# Patient Record
Sex: FEMALE | Race: WHITE | Employment: UNEMPLOYED | URBAN - NONMETROPOLITAN AREA
[De-identification: names, ages, dates, MRNs, and addresses within clinical notes are randomized per-mention and may not be internally consistent; named-entity substitution may affect disease eponyms.]

---

## 2017-01-08 ENCOUNTER — HOSPITAL ENCOUNTER (EMERGENCY)
Age: 22
Discharge: HOME | End: 2017-01-08
Attending: EMERGENCY MEDICINE
Payer: MEDICAID

## 2017-01-08 VITALS
SYSTOLIC BLOOD PRESSURE: 115 MMHG | HEART RATE: 79 BPM | OXYGEN SATURATION: 99 % | RESPIRATION RATE: 16 BRPM | BODY MASS INDEX: 25.71 KG/M2 | WEIGHT: 160 LBS | HEIGHT: 66 IN | TEMPERATURE: 98.1 F | DIASTOLIC BLOOD PRESSURE: 70 MMHG

## 2017-01-08 DIAGNOSIS — F32.A DEPRESSION, UNSPECIFIED DEPRESSION TYPE: ICD-10-CM

## 2017-01-08 DIAGNOSIS — F10.929 ACUTE ALCOHOLIC INTOXICATION, WITH UNSPECIFIED COMPLICATION (HCC): Primary | ICD-10-CM

## 2017-01-08 LAB
ACETAMINOPHEN LEVEL: <15 UG/ML
ALBUMIN SERPL-MCNC: 5 G/DL (ref 3.5–5.2)
ALP BLD-CCNC: 95 U/L (ref 35–104)
ALT SERPL-CCNC: 48 U/L (ref 5–33)
AMPHETAMINE SCREEN, URINE: NEGATIVE
ANION GAP SERPL CALCULATED.3IONS-SCNC: 15 MMOL/L (ref 7–19)
ANION GAP SERPL CALCULATED.3IONS-SCNC: 22 MMOL/L (ref 7–19)
AST SERPL-CCNC: 25 U/L (ref 5–32)
BARBITURATE SCREEN URINE: NEGATIVE
BASOPHILS ABSOLUTE: 0.1 K/UL (ref 0–0.2)
BASOPHILS RELATIVE PERCENT: 0.6 % (ref 0–1)
BENZODIAZEPINE SCREEN, URINE: NEGATIVE
BILIRUB SERPL-MCNC: <0.2 MG/DL (ref 0.2–1.2)
BILIRUBIN URINE: NEGATIVE
BLOOD, URINE: NEGATIVE
BUN BLDV-MCNC: 10 MG/DL (ref 6–20)
BUN BLDV-MCNC: 11 MG/DL (ref 6–20)
CALCIUM SERPL-MCNC: 9.3 MG/DL (ref 8.6–10)
CALCIUM SERPL-MCNC: 9.7 MG/DL (ref 8.6–10)
CANNABINOID SCREEN URINE: NEGATIVE
CHLORIDE BLD-SCNC: 102 MMOL/L (ref 98–111)
CHLORIDE BLD-SCNC: 104 MMOL/L (ref 98–111)
CLARITY: CLEAR
CO2: 18 MMOL/L (ref 22–29)
CO2: 22 MMOL/L (ref 22–29)
COCAINE METABOLITE SCREEN URINE: NEGATIVE
COLOR: YELLOW
CREAT SERPL-MCNC: 0.6 MG/DL (ref 0.5–0.9)
CREAT SERPL-MCNC: 0.7 MG/DL (ref 0.5–0.9)
EOSINOPHILS ABSOLUTE: 0 K/UL (ref 0–0.6)
EOSINOPHILS RELATIVE PERCENT: 0.2 % (ref 0–5)
ETHANOL: 236 MG/DL (ref 0–0.08)
ETHANOL: 85 MG/DL (ref 0–0.08)
GFR NON-AFRICAN AMERICAN: >60
GFR NON-AFRICAN AMERICAN: >60
GLOBULIN: 2.9 G/DL
GLUCOSE BLD-MCNC: 114 MG/DL (ref 74–109)
GLUCOSE BLD-MCNC: 88 MG/DL (ref 74–109)
GLUCOSE URINE: NEGATIVE MG/DL
HCG QUALITATIVE: NEGATIVE
HCT VFR BLD CALC: 40.9 % (ref 37–47)
HEMOGLOBIN: 13.7 G/DL (ref 12–16)
KETONES, URINE: NEGATIVE MG/DL
LEUKOCYTE ESTERASE, URINE: NEGATIVE
LYMPHOCYTES ABSOLUTE: 6.1 K/UL (ref 1.1–4.5)
LYMPHOCYTES RELATIVE PERCENT: 58 % (ref 20–40)
Lab: NORMAL
MCH RBC QN AUTO: 29.5 PG (ref 27–31)
MCHC RBC AUTO-ENTMCNC: 33.5 G/DL (ref 33–37)
MCV RBC AUTO: 88 FL (ref 81–99)
MONOCYTES ABSOLUTE: 0.5 K/UL (ref 0–0.9)
MONOCYTES RELATIVE PERCENT: 4.5 % (ref 0–10)
NEUTROPHILS ABSOLUTE: 3.8 K/UL (ref 1.5–7.5)
NEUTROPHILS RELATIVE PERCENT: 36.3 % (ref 50–65)
NITRITE, URINE: NEGATIVE
OPIATE SCREEN URINE: NEGATIVE
PDW BLD-RTO: 14.2 % (ref 11.5–14.5)
PH UA: 5.5
PLATELET # BLD: 409 K/UL (ref 130–400)
PMV BLD AUTO: 9.9 FL (ref 7.4–10.4)
POTASSIUM SERPL-SCNC: 2.9 MMOL/L (ref 3.5–5)
POTASSIUM SERPL-SCNC: 4 MMOL/L (ref 3.5–5)
PROTEIN UA: ABNORMAL MG/DL
RBC # BLD: 4.65 M/UL (ref 4.2–5.4)
SALICYLATE, SERUM: <3 MG/DL (ref 3–10)
SODIUM BLD-SCNC: 141 MMOL/L (ref 136–145)
SODIUM BLD-SCNC: 142 MMOL/L (ref 136–145)
SPECIFIC GRAVITY UA: 1.01
TOTAL PROTEIN: 7.9 G/DL (ref 6.6–8.7)
TSH SERPL DL<=0.05 MIU/L-ACNC: 4.01 UIU/ML (ref 0.27–4.2)
UROBILINOGEN, URINE: 0.2 E.U./DL
WBC # BLD: 10.4 K/UL (ref 4.8–10.8)

## 2017-01-08 PROCEDURE — 84443 ASSAY THYROID STIM HORMONE: CPT

## 2017-01-08 PROCEDURE — 80307 DRUG TEST PRSMV CHEM ANLYZR: CPT

## 2017-01-08 PROCEDURE — 84703 CHORIONIC GONADOTROPIN ASSAY: CPT

## 2017-01-08 PROCEDURE — 99284 EMERGENCY DEPT VISIT MOD MDM: CPT | Performed by: EMERGENCY MEDICINE

## 2017-01-08 PROCEDURE — 2500000003 HC RX 250 WO HCPCS: Performed by: EMERGENCY MEDICINE

## 2017-01-08 PROCEDURE — 81003 URINALYSIS AUTO W/O SCOPE: CPT

## 2017-01-08 PROCEDURE — G0480 DRUG TEST DEF 1-7 CLASSES: HCPCS

## 2017-01-08 PROCEDURE — 6370000000 HC RX 637 (ALT 250 FOR IP): Performed by: EMERGENCY MEDICINE

## 2017-01-08 PROCEDURE — 36415 COLL VENOUS BLD VENIPUNCTURE: CPT

## 2017-01-08 PROCEDURE — 99284 EMERGENCY DEPT VISIT MOD MDM: CPT

## 2017-01-08 PROCEDURE — 85025 COMPLETE CBC W/AUTO DIFF WBC: CPT

## 2017-01-08 PROCEDURE — 80053 COMPREHEN METABOLIC PANEL: CPT

## 2017-01-08 PROCEDURE — 6360000002 HC RX W HCPCS: Performed by: EMERGENCY MEDICINE

## 2017-01-08 PROCEDURE — 80048 BASIC METABOLIC PNL TOTAL CA: CPT

## 2017-01-08 PROCEDURE — 2580000003 HC RX 258: Performed by: EMERGENCY MEDICINE

## 2017-01-08 RX ORDER — PHENAZOPYRIDINE HYDROCHLORIDE 200 MG/1
200 TABLET, FILM COATED ORAL ONCE
Status: COMPLETED | OUTPATIENT
Start: 2017-01-08 | End: 2017-01-08

## 2017-01-08 RX ADMIN — PHENAZOPYRIDINE 200 MG: 200 TABLET ORAL at 03:05

## 2017-01-08 RX ADMIN — FOLIC ACID: 5 INJECTION, SOLUTION INTRAMUSCULAR; INTRAVENOUS; SUBCUTANEOUS at 03:20

## 2017-01-08 ASSESSMENT — ENCOUNTER SYMPTOMS
EYE DISCHARGE: 0
CHOKING: 0
NAUSEA: 0
VOICE CHANGE: 0
DIARRHEA: 0
CONSTIPATION: 0
BLOOD IN STOOL: 0
APNEA: 0
SORE THROAT: 0
FACIAL SWELLING: 0
SINUS PRESSURE: 0

## 2017-01-19 RX ORDER — MISOPROSTOL 100 UG/1
TABLET ORAL
Qty: 1 TABLET | Refills: 0 | Status: SHIPPED | OUTPATIENT
Start: 2017-01-19 | End: 2017-02-17

## 2017-01-20 ENCOUNTER — PROCEDURE VISIT (OUTPATIENT)
Dept: OBGYN | Age: 22
End: 2017-01-20
Payer: MEDICAID

## 2017-01-20 VITALS
SYSTOLIC BLOOD PRESSURE: 126 MMHG | HEART RATE: 94 BPM | DIASTOLIC BLOOD PRESSURE: 84 MMHG | WEIGHT: 148 LBS | TEMPERATURE: 97.4 F | BODY MASS INDEX: 23.89 KG/M2

## 2017-01-20 DIAGNOSIS — Z30.430 ENCOUNTER FOR IUD INSERTION: Primary | ICD-10-CM

## 2017-01-20 DIAGNOSIS — F41.9 ANXIETY AND DEPRESSION: ICD-10-CM

## 2017-01-20 DIAGNOSIS — F32.A ANXIETY AND DEPRESSION: ICD-10-CM

## 2017-01-20 PROCEDURE — 99214 OFFICE O/P EST MOD 30 MIN: CPT | Performed by: OBSTETRICS & GYNECOLOGY

## 2017-01-20 PROCEDURE — 58300 INSERT INTRAUTERINE DEVICE: CPT | Performed by: OBSTETRICS & GYNECOLOGY

## 2017-01-30 ENCOUNTER — TELEPHONE (OUTPATIENT)
Dept: OBGYN | Age: 22
End: 2017-01-30

## 2017-02-16 ENCOUNTER — TELEPHONE (OUTPATIENT)
Dept: OBGYN | Age: 22
End: 2017-02-16

## 2017-02-17 ENCOUNTER — OFFICE VISIT (OUTPATIENT)
Dept: OBGYN | Age: 22
End: 2017-02-17
Payer: MEDICAID

## 2017-02-17 VITALS
WEIGHT: 144 LBS | SYSTOLIC BLOOD PRESSURE: 120 MMHG | RESPIRATION RATE: 18 BRPM | DIASTOLIC BLOOD PRESSURE: 74 MMHG | HEIGHT: 66 IN | BODY MASS INDEX: 23.14 KG/M2 | TEMPERATURE: 97.7 F | HEART RATE: 90 BPM

## 2017-02-17 DIAGNOSIS — Z30.431 IUD CHECK UP: ICD-10-CM

## 2017-02-17 DIAGNOSIS — R30.0 BURNING WITH URINATION: Primary | ICD-10-CM

## 2017-02-17 LAB
APPEARANCE FLUID: CLEAR
BILIRUBIN, POC: NORMAL
BLOOD URINE, POC: NORMAL
CLARITY, POC: CLEAR
COLOR, POC: YELLOW
GLUCOSE URINE, POC: NORMAL
KETONES, POC: NORMAL
LEUKOCYTE EST, POC: NORMAL
NITRITE, POC: NORMAL
PH, POC: 5
PROTEIN, POC: NORMAL
SPECIFIC GRAVITY, POC: 1.01
UROBILINOGEN, POC: 0.2

## 2017-02-17 PROCEDURE — 99213 OFFICE O/P EST LOW 20 MIN: CPT | Performed by: NURSE PRACTITIONER

## 2017-02-17 ASSESSMENT — ENCOUNTER SYMPTOMS
SORE THROAT: 0
APNEA: 0
CONSTIPATION: 0
TROUBLE SWALLOWING: 0
NAUSEA: 0
DIARRHEA: 0
SHORTNESS OF BREATH: 0
WHEEZING: 0
ABDOMINAL PAIN: 0

## 2017-02-21 PROBLEM — K21.9 GASTROESOPHAGEAL REFLUX DISEASE: Status: ACTIVE | Noted: 2017-02-21

## 2017-02-21 PROBLEM — N20.9 UROLITHIASIS: Status: ACTIVE | Noted: 2017-02-21

## 2017-02-21 PROBLEM — Z00.00 WELLNESS EXAMINATION: Status: ACTIVE | Noted: 2017-02-21

## 2017-02-21 PROBLEM — F90.9 ADHD (ATTENTION DEFICIT HYPERACTIVITY DISORDER): Status: ACTIVE | Noted: 2017-02-21

## 2017-02-21 PROBLEM — R87.810 CERVICAL HIGH RISK HPV (HUMAN PAPILLOMAVIRUS) TEST POSITIVE: Status: ACTIVE | Noted: 2017-02-21

## 2017-02-22 ENCOUNTER — OFFICE VISIT (OUTPATIENT)
Dept: FAMILY MEDICINE CLINIC | Facility: CLINIC | Age: 22
End: 2017-02-22

## 2017-02-22 VITALS
BODY MASS INDEX: 22.98 KG/M2 | HEIGHT: 66 IN | SYSTOLIC BLOOD PRESSURE: 120 MMHG | RESPIRATION RATE: 16 BRPM | DIASTOLIC BLOOD PRESSURE: 66 MMHG | TEMPERATURE: 98.7 F | OXYGEN SATURATION: 99 % | WEIGHT: 143 LBS | HEART RATE: 108 BPM

## 2017-02-22 DIAGNOSIS — F90.9 ATTENTION DEFICIT HYPERACTIVITY DISORDER (ADHD), UNSPECIFIED ADHD TYPE: Primary | ICD-10-CM

## 2017-02-22 PROCEDURE — 99213 OFFICE O/P EST LOW 20 MIN: CPT | Performed by: FAMILY MEDICINE

## 2017-02-22 RX ORDER — DEXTROAMPHETAMINE SACCHARATE, AMPHETAMINE ASPARTATE, DEXTROAMPHETAMINE SULFATE AND AMPHETAMINE SULFATE 3.75; 3.75; 3.75; 3.75 MG/1; MG/1; MG/1; MG/1
15 TABLET ORAL DAILY
Qty: 30 TABLET | Refills: 0 | Status: SHIPPED | OUTPATIENT
Start: 2017-02-22 | End: 2017-03-24 | Stop reason: SDUPTHER

## 2017-02-22 NOTE — PROGRESS NOTES
Subjective   Suzi Vasquez is a 21 y.o. female presenting with chief complaint of:   Chief Complaint   Patient presents with   • ADD       History of Present Illness :  Alone.  Has multiple chronic problems; interval appointment.  One of these problems is ADHD.  Had as child.  Now that she has young twins; finding it hard to stay focused to get all the work needed done.  Denies depression, anxiety, anorexia, weight loss, palpitations.     Other chronic problem/s to consider: none    The following portions of the patient's history were reviewed and updated as appropriate: allergies, current medications, past family history, past medical history, past social history, past surgical history and problem list.  TCC migrated if needed/reviewed.      Current Outpatient Prescriptions:   •  levonorgestrel (MIRENA, 52 MG,) 20 MCG/24HR IUD, BC IMPLANT, Disp: , Rfl:     No Known Allergies    Review of Systems  GENERAL:  Active home; with twins. Sleep is often interrupted; otherwise ok. No fever.  ENDO:  No syncope, near or diaphoretic sweaty spells.  No BS issues during pregnancy.  HEENT: No head injury  headache,  No vision change, No hearing loss.  Ears without pain/drainage.  No sore throat.  No nasal/sinus congestion/drainage. No epistaxis.  CHEST: No chest wall tenderness or mass. Nocough,  without wheeze, SOB; no hemoptysis.  CV: No chest pain, palpatations, ankle edema.  GI: No heartburn, dysphagia.  No abdominal pain, diarrhea, constipation, rectal bleeding, or melena.    :  Voids without dysuria, or incontience to completion.  ORTHO: No painful/swollen joints but various on /off sore.  No  sore neck or back.  No acute neck or back pain without recent injury.   NEURO: No dizziness, weakness of extremities.  No numbness/parethesias.   PSYCH: No memory loss.  Mood good; not anxious, depressed but/and not suicidal.  Tolerated stress.     Results for orders placed or performed during the hospital encounter of 09/28/16  "  CBC Auto Differential   Result Value Ref Range    WBC 9.93 4.80 - 10.80 10*3/mm3    RBC 3.85 (L) 4.20 - 5.40 10*6/mm3    Hemoglobin 10.9 (L) 12.0 - 16.0 g/dL    Hematocrit 33.4 (L) 37.0 - 47.0 %    MCV 86.8 82.0 - 98.0 fL    MCH 28.3 28.0 - 32.0 pg    MCHC 32.6 (L) 33.0 - 36.0 g/dL    RDW 17.4 (H) 12.0 - 15.0 %    RDW-SD 51.5 40.0 - 54.0 fl    MPV 11.8 6.0 - 12.0 fL    Platelets 220 130 - 400 10*3/mm3    Neutrophil % 68.3 39.0 - 78.0 %    Lymphocyte % 23.1 15.0 - 45.0 %    Monocyte % 6.5 4.0 - 12.0 %    Eosinophil % 0.8 0.0 - 4.0 %    Basophil % 0.4 0.0 - 2.0 %    Immature Grans % 0.9 0.0 - 5.0 %    Neutrophils, Absolute 6.78 1.87 - 8.40 10*3/mm3    Lymphocytes, Absolute 2.29 0.72 - 4.86 10*3/mm3    Monocytes, Absolute 0.65 0.19 - 1.30 10*3/mm3    Eosinophils, Absolute 0.08 0.00 - 0.70 10*3/mm3    Basophils, Absolute 0.04 0.00 - 0.20 10*3/mm3    Immature Grans, Absolute 0.09 (H) 0.00 - 0.03 10*3/mm3   Type & Screen   Result Value Ref Range    ABO Type A     RH type Positive     Antibody Screen Negative        No results found for: HGBA1C    No results found for: NA, K, CL, CO2, GLUCOSE, BUN, CREATININE, CALCIUM, EGFRCLEARA    No results found for: PSA     Objective   Visit Vitals   • /66 (BP Location: Right arm, Patient Position: Sitting, Cuff Size: Adult)   • Pulse 108   • Temp 98.7 °F (37.1 °C) (Oral)   • Resp 16   • Ht 66\" (167.6 cm)   • Wt 143 lb (64.9 kg)   • SpO2 99%   • Breastfeeding No   • BMI 23.08 kg/m2       Physical Exam  GENERAL:  Well nourished/developed in no acute distress. Thin  SKIN: Turgor excellent, without wound, rash, lesion  HEENT: Normal cephalic without trauma.  Pupils equal round reactive to light. Extraocular motions full without nystagmus.   External canals nonobstructive nontender without reddness. Tymphatic membranes loly with gary structures intact.   Oral cavity without growths, exudates, and moist.  Posterior pharnyx without mass, obstruction, reddness.  No thyroidmegaly, " mass, tenderness, lymphadenopathy and supple.  CV: Regular rhythm.  No murmur, gallop,  edema. Posterior pulses intact.  No carotid bruits.  CHEST: No chest wall tenderness or mass.   LUNGS: Symmetric motion with clear to auscultation.   ABD: Soft, nontender without mass.   ORTHO: Symmetric extremities without swelling/point tenderness.  Full gross range of motion.    NEURO: CN 2-12 grossly intact.  Symmetric facies. 1/4 x bicep knee equal reflexes.  UE/LE   4/5 strength throughout.  Nonfocal use extremities. Speech clear. Intact light touch with monofilament, vibratory sensation with tuning fork; equal toes/distal feet.    PSYCH: Oriented x 3.  Pleasant calm, well kept.  Purposeful/directed conservation with intact short/long gross memory.     Assessment/Plan     1. Attention deficit hyperactivity disorder (ADHD), unspecified ADHD type  - amphetamine-dextroamphetamine (ADDERALL) 15 MG tablet; Take 1 tablet by mouth Daily.  Dispense: 30 tablet; Refill: 0      Rx: reviewed.  Changes if above  LAB: reviewed/above, and if orders    There are no Patient Instructions on file for this visit.    Follow up: Return for 3m.

## 2017-03-10 ENCOUNTER — OFFICE VISIT (OUTPATIENT)
Dept: OBGYN | Age: 22
End: 2017-03-10
Payer: MEDICAID

## 2017-03-10 VITALS
WEIGHT: 143 LBS | BODY MASS INDEX: 22.98 KG/M2 | DIASTOLIC BLOOD PRESSURE: 60 MMHG | HEIGHT: 66 IN | SYSTOLIC BLOOD PRESSURE: 120 MMHG

## 2017-03-10 DIAGNOSIS — Z30.431 IUD CHECK UP: Primary | ICD-10-CM

## 2017-03-10 PROCEDURE — 99213 OFFICE O/P EST LOW 20 MIN: CPT | Performed by: OBSTETRICS & GYNECOLOGY

## 2017-03-10 ASSESSMENT — ENCOUNTER SYMPTOMS
EYES NEGATIVE: 1
GASTROINTESTINAL NEGATIVE: 1
ALLERGIC/IMMUNOLOGIC NEGATIVE: 1
RESPIRATORY NEGATIVE: 1

## 2017-03-24 DIAGNOSIS — F90.9 ATTENTION DEFICIT HYPERACTIVITY DISORDER (ADHD), UNSPECIFIED ADHD TYPE: ICD-10-CM

## 2017-03-24 RX ORDER — DEXTROAMPHETAMINE SACCHARATE, AMPHETAMINE ASPARTATE, DEXTROAMPHETAMINE SULFATE AND AMPHETAMINE SULFATE 3.75; 3.75; 3.75; 3.75 MG/1; MG/1; MG/1; MG/1
15 TABLET ORAL DAILY
Qty: 30 TABLET | Refills: 0 | Status: SHIPPED | OUTPATIENT
Start: 2017-03-24 | End: 2017-04-25 | Stop reason: SDUPTHER

## 2017-04-25 DIAGNOSIS — F90.9 ATTENTION DEFICIT HYPERACTIVITY DISORDER (ADHD), UNSPECIFIED ADHD TYPE: ICD-10-CM

## 2017-04-25 RX ORDER — DEXTROAMPHETAMINE SACCHARATE, AMPHETAMINE ASPARTATE, DEXTROAMPHETAMINE SULFATE AND AMPHETAMINE SULFATE 3.75; 3.75; 3.75; 3.75 MG/1; MG/1; MG/1; MG/1
15 TABLET ORAL DAILY
Qty: 30 TABLET | Refills: 0 | Status: SHIPPED | OUTPATIENT
Start: 2017-04-25 | End: 2017-06-01 | Stop reason: SDUPTHER

## 2017-05-16 PROBLEM — F32.A DEPRESSION: Status: ACTIVE | Noted: 2017-05-16

## 2017-05-16 PROBLEM — R61 HYPERHIDROSIS: Status: ACTIVE | Noted: 2017-05-16

## 2017-05-22 ENCOUNTER — OFFICE VISIT (OUTPATIENT)
Dept: FAMILY MEDICINE CLINIC | Facility: CLINIC | Age: 22
End: 2017-05-22

## 2017-05-22 VITALS
WEIGHT: 146 LBS | HEART RATE: 92 BPM | HEIGHT: 66 IN | SYSTOLIC BLOOD PRESSURE: 118 MMHG | OXYGEN SATURATION: 99 % | DIASTOLIC BLOOD PRESSURE: 60 MMHG | BODY MASS INDEX: 23.46 KG/M2

## 2017-05-22 DIAGNOSIS — F32.A DEPRESSION, UNSPECIFIED DEPRESSION TYPE: ICD-10-CM

## 2017-05-22 DIAGNOSIS — F90.9 ATTENTION DEFICIT HYPERACTIVITY DISORDER (ADHD), UNSPECIFIED ADHD TYPE: Primary | ICD-10-CM

## 2017-05-22 PROCEDURE — 99213 OFFICE O/P EST LOW 20 MIN: CPT | Performed by: FAMILY MEDICINE

## 2017-06-01 DIAGNOSIS — F90.9 ATTENTION DEFICIT HYPERACTIVITY DISORDER (ADHD), UNSPECIFIED ADHD TYPE: ICD-10-CM

## 2017-06-01 RX ORDER — DEXTROAMPHETAMINE SACCHARATE, AMPHETAMINE ASPARTATE, DEXTROAMPHETAMINE SULFATE AND AMPHETAMINE SULFATE 3.75; 3.75; 3.75; 3.75 MG/1; MG/1; MG/1; MG/1
15 TABLET ORAL DAILY
Qty: 30 TABLET | Refills: 0 | Status: SHIPPED | OUTPATIENT
Start: 2017-06-01 | End: 2017-07-03 | Stop reason: SDUPTHER

## 2017-07-03 DIAGNOSIS — F90.9 ATTENTION DEFICIT HYPERACTIVITY DISORDER (ADHD), UNSPECIFIED ADHD TYPE: ICD-10-CM

## 2017-07-03 RX ORDER — DEXTROAMPHETAMINE SACCHARATE, AMPHETAMINE ASPARTATE, DEXTROAMPHETAMINE SULFATE AND AMPHETAMINE SULFATE 3.75; 3.75; 3.75; 3.75 MG/1; MG/1; MG/1; MG/1
15 TABLET ORAL DAILY
Qty: 30 TABLET | Refills: 0 | Status: SHIPPED | OUTPATIENT
Start: 2017-07-03 | End: 2017-08-11 | Stop reason: SDUPTHER

## 2017-07-10 ENCOUNTER — OFFICE VISIT (OUTPATIENT)
Dept: OBGYN | Age: 22
End: 2017-07-10
Payer: MEDICAID

## 2017-07-10 VITALS
HEART RATE: 82 BPM | SYSTOLIC BLOOD PRESSURE: 108 MMHG | BODY MASS INDEX: 23.63 KG/M2 | DIASTOLIC BLOOD PRESSURE: 73 MMHG | WEIGHT: 147 LBS | HEIGHT: 66 IN

## 2017-07-10 DIAGNOSIS — R10.2 PELVIC PAIN IN FEMALE: Primary | ICD-10-CM

## 2017-07-10 DIAGNOSIS — Z30.431 IUD SURVEILLANCE: ICD-10-CM

## 2017-07-10 PROCEDURE — 99213 OFFICE O/P EST LOW 20 MIN: CPT | Performed by: NURSE PRACTITIONER

## 2017-07-10 RX ORDER — DEXTROAMPHETAMINE SACCHARATE, AMPHETAMINE ASPARTATE, DEXTROAMPHETAMINE SULFATE AND AMPHETAMINE SULFATE 3.75; 3.75; 3.75; 3.75 MG/1; MG/1; MG/1; MG/1
TABLET ORAL
COMMUNITY
Start: 2017-07-03

## 2017-07-10 ASSESSMENT — ENCOUNTER SYMPTOMS
RESPIRATORY NEGATIVE: 1
EYES NEGATIVE: 1
GASTROINTESTINAL NEGATIVE: 1

## 2017-07-17 ENCOUNTER — HOSPITAL ENCOUNTER (OUTPATIENT)
Dept: ULTRASOUND IMAGING | Age: 22
Discharge: HOME | End: 2017-07-17
Payer: MEDICAID

## 2017-07-17 DIAGNOSIS — Z30.431 IUD SURVEILLANCE: ICD-10-CM

## 2017-07-17 DIAGNOSIS — R10.2 PELVIC PAIN IN FEMALE: ICD-10-CM

## 2017-07-17 PROCEDURE — 76830 TRANSVAGINAL US NON-OB: CPT

## 2017-07-18 DIAGNOSIS — N83.201 RIGHT OVARIAN CYST: Primary | ICD-10-CM

## 2017-07-20 ENCOUNTER — TELEPHONE (OUTPATIENT)
Dept: OBGYN | Age: 22
End: 2017-07-20

## 2017-08-11 DIAGNOSIS — F90.9 ATTENTION DEFICIT HYPERACTIVITY DISORDER (ADHD), UNSPECIFIED ADHD TYPE: ICD-10-CM

## 2017-08-11 RX ORDER — DEXTROAMPHETAMINE SACCHARATE, AMPHETAMINE ASPARTATE, DEXTROAMPHETAMINE SULFATE AND AMPHETAMINE SULFATE 3.75; 3.75; 3.75; 3.75 MG/1; MG/1; MG/1; MG/1
15 TABLET ORAL DAILY
Qty: 30 TABLET | Refills: 0 | Status: SHIPPED | OUTPATIENT
Start: 2017-08-11 | End: 2017-09-19 | Stop reason: SDUPTHER

## 2017-09-18 ENCOUNTER — TELEPHONE (OUTPATIENT)
Dept: OBGYN | Age: 22
End: 2017-09-18

## 2017-09-19 DIAGNOSIS — F90.9 ATTENTION DEFICIT HYPERACTIVITY DISORDER (ADHD), UNSPECIFIED ADHD TYPE: ICD-10-CM

## 2017-09-19 RX ORDER — DEXTROAMPHETAMINE SACCHARATE, AMPHETAMINE ASPARTATE, DEXTROAMPHETAMINE SULFATE AND AMPHETAMINE SULFATE 3.75; 3.75; 3.75; 3.75 MG/1; MG/1; MG/1; MG/1
15 TABLET ORAL DAILY
Qty: 30 TABLET | Refills: 0 | Status: SHIPPED | OUTPATIENT
Start: 2017-09-19 | End: 2017-10-23 | Stop reason: SDUPTHER

## 2017-09-20 ENCOUNTER — OFFICE VISIT (OUTPATIENT)
Dept: FAMILY MEDICINE CLINIC | Facility: CLINIC | Age: 22
End: 2017-09-20

## 2017-09-20 ENCOUNTER — TELEPHONE (OUTPATIENT)
Dept: FAMILY MEDICINE CLINIC | Facility: CLINIC | Age: 22
End: 2017-09-20

## 2017-09-20 VITALS
HEIGHT: 66 IN | BODY MASS INDEX: 23.63 KG/M2 | HEART RATE: 107 BPM | SYSTOLIC BLOOD PRESSURE: 118 MMHG | OXYGEN SATURATION: 98 % | DIASTOLIC BLOOD PRESSURE: 82 MMHG | RESPIRATION RATE: 16 BRPM | WEIGHT: 147 LBS

## 2017-09-20 DIAGNOSIS — R21 RASH: ICD-10-CM

## 2017-09-20 DIAGNOSIS — L29.9 ITCHING: Primary | ICD-10-CM

## 2017-09-20 PROCEDURE — 99213 OFFICE O/P EST LOW 20 MIN: CPT | Performed by: FAMILY MEDICINE

## 2017-09-20 PROCEDURE — 96372 THER/PROPH/DIAG INJ SC/IM: CPT | Performed by: FAMILY MEDICINE

## 2017-09-20 RX ORDER — DEXAMETHASONE SODIUM PHOSPHATE 10 MG/ML
10 INJECTION INTRAMUSCULAR; INTRAVENOUS ONCE
Status: DISCONTINUED | OUTPATIENT
Start: 2017-09-20 | End: 2017-09-20

## 2017-09-20 RX ORDER — HYDROXYZINE PAMOATE 25 MG/1
25 CAPSULE ORAL 3 TIMES DAILY PRN
Qty: 20 CAPSULE | Refills: 1 | Status: SHIPPED | OUTPATIENT
Start: 2017-09-20 | End: 2017-12-05

## 2017-09-20 RX ORDER — DEXAMETHASONE SODIUM PHOSPHATE 4 MG/ML
10 INJECTION, SOLUTION INTRA-ARTICULAR; INTRALESIONAL; INTRAMUSCULAR; INTRAVENOUS; SOFT TISSUE ONCE
Status: COMPLETED | OUTPATIENT
Start: 2017-09-20 | End: 2017-09-20

## 2017-09-20 RX ORDER — METHYLPREDNISOLONE 4 MG/1
TABLET ORAL
Qty: 1 EACH | Refills: 0 | Status: SHIPPED | OUTPATIENT
Start: 2017-09-20 | End: 2017-12-05

## 2017-09-20 RX ADMIN — DEXAMETHASONE SODIUM PHOSPHATE 10 MG: 4 INJECTION, SOLUTION INTRA-ARTICULAR; INTRALESIONAL; INTRAMUSCULAR; INTRAVENOUS; SOFT TISSUE at 12:02

## 2017-09-21 NOTE — PROGRESS NOTES
Subjective   Suzi Vasquez is a 22 y.o. female presenting with chief complaint of:   Chief Complaint   Patient presents with   • hives     severe itching / right lower side/ both legs / under left arm        History of Present Illness :  Alone.  Here for primarily an acute issue today; rash R side/others since yesterday.  Very pruritic. No known cause.  No fever; no others involved.   Has  chronic problems to consider; not interval appointment.  One of these problems is ADD  .     Other chronic problem/s to consider: none    Has an/another acute issue today: none.    The following portions of the patient's history were reviewed and updated as appropriate: allergies, current medications, past family history, past medical history, past social history, past surgical history and problem list.  Records acquired and reviewed; TCC migrated.      Current Outpatient Prescriptions:   •  amphetamine-dextroamphetamine (ADDERALL) 15 MG tablet, Take 1 tablet by mouth Daily., Disp: 30 tablet, Rfl: 0  •  levonorgestrel (MIRENA, 52 MG,) 20 MCG/24HR IUD, BC IMPLANT, Disp: , Rfl:     No Known Allergies    Review of Systems  GENERAL:  Active home. Sleep is ok. No fever now.  ENDO:  No syncope, near or diaphoretic sweaty spells.  HEENT: No head injury or headache,  No vision change, No hearing loss.  Ears without pain/drainage.  No sore throat.  No significant nasal/sinus congestion/drainage. No epistaxis.  CHEST: No chest wall tenderness or mass. No significant cough, without wheeze, SOB; no hemoptysis.  CV: No chest pain, palpitations, ankle edema.  GI: No heartburn, dysphagia.  No abdominal pain, diarrhea, constipation, rectal bleeding, or melena.    :  Voids without dysuria, or incontinence to completion.  ORTHO: No painful/swollen joints but various on /off sore.  No sore neck or back.  No acute neck or back pain without recent injury.   NEURO: No dizziness, weakness of extremities.  No numbness/paresthesias.   PSYCH: No  "memory loss.  Mood good; occ anxious, depressed but/and not suicidal.  Tolerated stress.     Results for orders placed or performed during the hospital encounter of 09/28/16   CBC Auto Differential   Result Value Ref Range    WBC 9.93 4.80 - 10.80 10*3/mm3    RBC 3.85 (L) 4.20 - 5.40 10*6/mm3    Hemoglobin 10.9 (L) 12.0 - 16.0 g/dL    Hematocrit 33.4 (L) 37.0 - 47.0 %    MCV 86.8 82.0 - 98.0 fL    MCH 28.3 28.0 - 32.0 pg    MCHC 32.6 (L) 33.0 - 36.0 g/dL    RDW 17.4 (H) 12.0 - 15.0 %    RDW-SD 51.5 40.0 - 54.0 fl    MPV 11.8 6.0 - 12.0 fL    Platelets 220 130 - 400 10*3/mm3    Neutrophil % 68.3 39.0 - 78.0 %    Lymphocyte % 23.1 15.0 - 45.0 %    Monocyte % 6.5 4.0 - 12.0 %    Eosinophil % 0.8 0.0 - 4.0 %    Basophil % 0.4 0.0 - 2.0 %    Immature Grans % 0.9 0.0 - 5.0 %    Neutrophils, Absolute 6.78 1.87 - 8.40 10*3/mm3    Lymphocytes, Absolute 2.29 0.72 - 4.86 10*3/mm3    Monocytes, Absolute 0.65 0.19 - 1.30 10*3/mm3    Eosinophils, Absolute 0.08 0.00 - 0.70 10*3/mm3    Basophils, Absolute 0.04 0.00 - 0.20 10*3/mm3    Immature Grans, Absolute 0.09 (H) 0.00 - 0.03 10*3/mm3   Type & Screen   Result Value Ref Range    ABO Type A     RH type Positive     Antibody Screen Negative        No results found for: HGBA1C    No results found for: NA, K, CL, CO2, GLUCOSE, BUN, CREATININE, CALCIUM, EGFRCLEARA    No results found for: PSA     Lab Results:  CBC:  Lab Results - Last 18 Months  Lab Units 09/29/16  0032   WBC 10*3/mm3 9.93   HEMATOCRIT % 33.4*     CMP:No results for input(s): NA, CL, CO2, BUN, CREATININE, EGFRIFNONA, EGFRIFAFRI, CALCIUM in the last 74400 hours.    Invalid input(s):  GLUCOSE  THYROID:No results for input(s): TSH, T3FREE, FREET4 in the last 47931 hours.    Invalid input(s): T3, T4  A1C:No results for input(s): HGBA1C in the last 22758 hours.    Objective   /82 (BP Location: Left arm, Patient Position: Sitting, Cuff Size: Adult)  Pulse 107  Resp 16  Ht 66\" (167.6 cm)  Wt 147 lb (66.7 kg)  SpO2 " 98%  BMI 23.73 kg/m2    Physical Exam  GENERAL:  Well nourished/developed in no acute distress. Thin  SKIN: Turgor excellent, without wound, rash, lesion. PHOTO R lower abdomen/side; others involved.   HEENT: Normal cephalic without trauma.  Pupils equal round reactive to light. Extraocular motions full without nystagmus.   External canals nonobstructive nontender without reddness. Tymphatic membranes loly with gary structures intact.   Oral cavity without growths, exudates, and moist.  Posterior pharynx without mass, obstruction, redness.  No thyromegaly, mass, tenderness, lymphadenopathy and supple.  CV: Regular rhythm.  No murmur, gallop,  edema.  CHEST: No chest wall tenderness or mass.   LUNGS: Symmetric motion with clear to auscultation.  ABD: Soft, nontender without mass.   ORTHO: Symmetric extremities without swelling/point tenderness.  Full gross range of motion.  NEURO: CN 2-12 grossly intact.  Symmetric facies.  UE/LE   3/5 strength throughout.  Nonfocal use extremities. Speech clear.    PSYCH: Oriented x 3.  Pleasant calm, well kept.  Purposeful/directed conservation with intact short/long gross memory.     Assessment/Plan     1. Itching  With rash  - MethylPREDNISolone (MEDROL, CHEVY,) 4 MG tablet; Take as directed on package instructions.  Dispense: 1 each; Refill: 0  - hydrOXYzine (VISTARIL) 25 MG capsule; Take 1 capsule by mouth 3 (Three) Times a Day As Needed for Itching.  Dispense: 20 capsule; Refill: 1  - triamcinolone (KENALOG) 0.1 % ointment; Apply  topically 2 (Two) Times a Day.  Dispense: 15 g; Refill: 0  - dexamethasone (DECADRON) injection 10 mg; Inject 2.5 mL into the shoulder, thigh, or buttocks 1 (One) Time.    2. Rash  Looks contact; ? what  - MethylPREDNISolone (MEDROL, CHEVY,) 4 MG tablet; Take as directed on package instructions.  Dispense: 1 each; Refill: 0  - hydrOXYzine (VISTARIL) 25 MG capsule; Take 1 capsule by mouth 3 (Three) Times a Day As Needed for Itching.  Dispense: 20  capsule; Refill: 1  - triamcinolone (KENALOG) 0.1 % ointment; Apply  topically 2 (Two) Times a Day.  Dispense: 15 g; Refill: 0  - dexamethasone (DECADRON) injection 10 mg; Inject 2.5 mL into the shoulder, thigh, or buttocks 1 (One) Time.      Rx: reviewed.  Any other changes above and:   LAB: reviewed/above.  Orders above and:     There are no Patient Instructions on file for this visit.    Follow up: No Follow-up on file.  Future Appointments  Date Time Provider Department Center   12/5/2017 2:00 PM Jaylon Acevedo MD [facility] None

## 2017-09-29 ENCOUNTER — HOSPITAL ENCOUNTER (OUTPATIENT)
Dept: ULTRASOUND IMAGING | Age: 22
Discharge: HOME | End: 2017-09-29
Payer: MEDICAID

## 2017-09-29 DIAGNOSIS — N83.201 RIGHT OVARIAN CYST: ICD-10-CM

## 2017-09-29 PROCEDURE — 76830 TRANSVAGINAL US NON-OB: CPT

## 2017-10-23 DIAGNOSIS — F90.9 ATTENTION DEFICIT HYPERACTIVITY DISORDER (ADHD), UNSPECIFIED ADHD TYPE: ICD-10-CM

## 2017-10-23 RX ORDER — DEXTROAMPHETAMINE SACCHARATE, AMPHETAMINE ASPARTATE, DEXTROAMPHETAMINE SULFATE AND AMPHETAMINE SULFATE 3.75; 3.75; 3.75; 3.75 MG/1; MG/1; MG/1; MG/1
15 TABLET ORAL DAILY
Qty: 30 TABLET | Refills: 0 | Status: SHIPPED | OUTPATIENT
Start: 2017-10-23 | End: 2017-11-28 | Stop reason: SDUPTHER

## 2017-11-17 ENCOUNTER — TELEPHONE (OUTPATIENT)
Dept: OBGYN | Age: 22
End: 2017-11-17

## 2017-11-17 RX ORDER — NITROFURANTOIN 25; 75 MG/1; MG/1
100 CAPSULE ORAL 2 TIMES DAILY
Qty: 14 CAPSULE | Refills: 0 | Status: SHIPPED | OUTPATIENT
Start: 2017-11-17 | End: 2017-11-24

## 2017-11-17 NOTE — TELEPHONE ENCOUNTER
Lower back has been bothering her for a week and has some burn when urinating. No discharge started irregular cycle with mirena. Some itching noted. Advised if yeast she will probably know after being on some antibiotics.  ADELINE CORONEL.

## 2017-11-28 DIAGNOSIS — F90.9 ATTENTION DEFICIT HYPERACTIVITY DISORDER (ADHD), UNSPECIFIED ADHD TYPE: ICD-10-CM

## 2017-11-28 RX ORDER — DEXTROAMPHETAMINE SACCHARATE, AMPHETAMINE ASPARTATE, DEXTROAMPHETAMINE SULFATE AND AMPHETAMINE SULFATE 3.75; 3.75; 3.75; 3.75 MG/1; MG/1; MG/1; MG/1
15 TABLET ORAL DAILY
Qty: 30 TABLET | Refills: 0 | Status: SHIPPED | OUTPATIENT
Start: 2017-11-28 | End: 2018-01-26 | Stop reason: SDUPTHER

## 2017-11-29 ENCOUNTER — OFFICE VISIT (OUTPATIENT)
Dept: OBGYN | Age: 22
End: 2017-11-29
Payer: MEDICAID

## 2017-11-29 VITALS
HEART RATE: 88 BPM | BODY MASS INDEX: 25.95 KG/M2 | SYSTOLIC BLOOD PRESSURE: 118 MMHG | WEIGHT: 152 LBS | HEIGHT: 64 IN | DIASTOLIC BLOOD PRESSURE: 88 MMHG

## 2017-11-29 DIAGNOSIS — M54.50 LOW BACK PAIN WITHOUT SCIATICA, UNSPECIFIED BACK PAIN LATERALITY, UNSPECIFIED CHRONICITY: Primary | ICD-10-CM

## 2017-11-29 DIAGNOSIS — N89.8 VAGINAL ITCHING: ICD-10-CM

## 2017-11-29 DIAGNOSIS — N89.8 VAGINAL DISCHARGE: ICD-10-CM

## 2017-11-29 DIAGNOSIS — R31.9 HEMATURIA, UNSPECIFIED TYPE: ICD-10-CM

## 2017-11-29 LAB
APPEARANCE FLUID: ABNORMAL
BILIRUBIN, POC: ABNORMAL
BLOOD URINE, POC: ABNORMAL
CLARITY, POC: ABNORMAL
COLOR, POC: ABNORMAL
GLUCOSE URINE, POC: ABNORMAL
KETONES, POC: ABNORMAL
LEUKOCYTE EST, POC: ABNORMAL
NITRITE, POC: ABNORMAL
PH, POC: ABNORMAL
PROTEIN, POC: ABNORMAL
SPECIFIC GRAVITY, POC: ABNORMAL
UROBILINOGEN, POC: ABNORMAL

## 2017-11-29 PROCEDURE — 99213 OFFICE O/P EST LOW 20 MIN: CPT | Performed by: NURSE PRACTITIONER

## 2017-11-29 RX ORDER — FLUCONAZOLE 150 MG/1
TABLET ORAL
Qty: 2 TABLET | Refills: 0 | Status: SHIPPED | OUTPATIENT
Start: 2017-11-29 | End: 2018-04-09

## 2017-11-29 ASSESSMENT — ENCOUNTER SYMPTOMS
GASTROINTESTINAL NEGATIVE: 1
RESPIRATORY NEGATIVE: 1
EYES NEGATIVE: 1
BACK PAIN: 1

## 2017-11-29 NOTE — PATIENT INSTRUCTIONS
Patient Education        Back Pain: Care Instructions  Your Care Instructions    Back pain has many possible causes. It is often related to problems with muscles and ligaments of the back. It may also be related to problems with the nerves, discs, or bones of the back. Moving, lifting, standing, sitting, or sleeping in an awkward way can strain the back. Sometimes you don't notice the injury until later. Arthritis is another common cause of back pain. Although it may hurt a lot, back pain usually improves on its own within several weeks. Most people recover in 12 weeks or less. Using good home treatment and being careful not to stress your back can help you feel better sooner. Follow-up care is a key part of your treatment and safety. Be sure to make and go to all appointments, and call your doctor if you are having problems. Its also a good idea to know your test results and keep a list of the medicines you take. How can you care for yourself at home? · Sit or lie in positions that are most comfortable and reduce your pain. Try one of these positions when you lie down:  ¨ Lie on your back with your knees bent and supported by large pillows. ¨ Lie on the floor with your legs on the seat of a sofa or chair. Candiss [place] on your side with your knees and hips bent and a pillow between your legs. ¨ Lie on your stomach if it does not make pain worse. · Do not sit up in bed, and avoid soft couches and twisted positions. Bed rest can help relieve pain at first, but it delays healing. Avoid bed rest after the first day of back pain. · Change positions every 30 minutes. If you must sit for long periods of time, take breaks from sitting. Get up and walk around, or lie in a comfortable position. · Try using a heating pad on a low or medium setting for 15 to 20 minutes every 2 or 3 hours. Try a warm shower in place of one session with the heating pad. · You can also try an ice pack for 10 to 15 minutes every 2 to 3 hours. Put a thin cloth between the ice pack and your skin. · Take pain medicines exactly as directed. ¨ If the doctor gave you a prescription medicine for pain, take it as prescribed. ¨ If you are not taking a prescription pain medicine, ask your doctor if you can take an over-the-counter medicine. · Take short walks several times a day. You can start with 5 to 10 minutes, 3 or 4 times a day, and work up to longer walks. Walk on level surfaces and avoid hills and stairs until your back is better. · Return to work and other activities as soon as you can. Continued rest without activity is usually not good for your back. · To prevent future back pain, do exercises to stretch and strengthen your back and stomach. Learn how to use good posture, safe lifting techniques, and proper body mechanics. When should you call for help? Call your doctor now or seek immediate medical care if:  · You have new or worsening numbness in your legs. · You have new or worsening weakness in your legs. (This could make it hard to stand up.)  · You lose control of your bladder or bowels. Watch closely for changes in your health, and be sure to contact your doctor if:  · Your pain gets worse. · You are not getting better after 2 weeks. Where can you learn more? Go to https://Goombal[employer]. org and sign in to your [employer] account. Enter S518 in the EMBI box to learn more about \"Back Pain: Care Instructions. \"     If you do not have an account, please click on the \"Sign Up Now\" link. Current as of: March 21, 2017  Content Version: 11.3  © 2797-6717 [employer]. Care instructions adapted under license by [email or web address] [facility] ([facility]). If you have questions about a medical condition or this instruction, always ask your healthcare professional. [address] any warranty or liability for your use of this information.

## 2017-11-29 NOTE — PROGRESS NOTES
Sheyla White is a 20 y.o. female who presents today for her medical conditions/ complaints as noted below. Sheyla White is c/o of Lower Back Pain        HPI  Pt here for low back pain, thought was getting a uti. Had Phoebe Buoy sent in but didn't take correctly over Thanksgiving. Is feeling much better, but thinks maybe still a lingering uti. Also having some vaginal itching. Last mammogram none  Last pap 2016  Contraception mirena  Last bone density none  Last colonoscopy none    No LMP recorded. Patient is not currently having periods (Reason: Other - See Notes). Past Medical History:   Diagnosis Date    ADHD (attention deficit hyperactivity disorder)     Anxiety     Depression     Kidney stones      Past Surgical History:   Procedure Laterality Date     SECTION N/A 10/13/2016     SECTION performed by Gumaro Salinas MD at [address] [place] [address] L&D OR    CYST REMOVAL      in throat    SKIN BIOPSY      atypical cells    TONSILLECTOMY      WISDOM TOOTH EXTRACTION       Family History   Problem Relation Age of Onset    Diabetes Paternal Grandmother     Breast Cancer Maternal Grandmother      Social History   Substance Use Topics    Smoking status: Former Smoker     Quit date: 2014    Smokeless tobacco: Never Used    Alcohol use Yes      Comment: 1/2 pint of crown nihcole today       Current Outpatient Prescriptions   Medication Sig Dispense Refill    fluconazole (DIFLUCAN) 150 MG tablet Take 1 now and 1 in three days. 2 tablet 0    amphetamine-dextroamphetamine (ADDERALL) 15 MG tablet .  MIRENA, 52 MG, 20 MCG/24HR IUD        No current facility-administered medications for this visit. No Known Allergies  Vitals:    17 1055   BP: 118/88   Pulse: 88     Body mass index is 26.09 kg/m². Review of Systems   Constitutional: Negative. HENT: Negative. Eyes: Negative. Respiratory: Negative. Cardiovascular: Negative. Gastrointestinal: Negative. Genitourinary: Negative for difficulty urinating, dyspareunia, dysuria, enuresis, frequency, hematuria, menstrual problem, pelvic pain, urgency and vaginal discharge. Musculoskeletal: Positive for back pain (pt c/o lower back pain). Skin: Negative. Neurological: Negative. Psychiatric/Behavioral: Negative. Physical Exam   Constitutional: She is oriented to person, place, and time. She appears well-developed and well-nourished. HENT:   Head: Normocephalic and atraumatic. Eyes: Pupils are equal, round, and reactive to light. Neck: Normal range of motion. Genitourinary:   Genitourinary Comments: Vaginal culture obtained   Musculoskeletal: Normal range of motion. Neurological: She is alert and oriented to person, place, and time. Skin: Skin is warm and dry. Psychiatric: She has a normal mood and affect. Her behavior is normal.   Nursing note and vitals reviewed. 1. Low back pain without sciatica, unspecified back pain laterality, unspecified chronicity  POCT Urinalysis no Micro   2. Hematuria, unspecified type  Urine Culture   3. Vaginal itching  Miscellaneous Sendout 1    fluconazole (DIFLUCAN) 150 MG tablet   4. Vaginal discharge  Miscellaneous Sendout 1       MEDICATIONS:  Orders Placed This Encounter   Medications    fluconazole (DIFLUCAN) 150 MG tablet     Sig: Take 1 now and 1 in three days. Dispense:  2 tablet     Refill:  0       ORDERS:  Orders Placed This Encounter   Procedures    Urine Culture    Miscellaneous Sendout 1    POCT Urinalysis no Micro       PLAN:  Encouraged pt to finish out her abx and sent in dfilucan while awaiting cultures. Encouraged plenty of fluids, nick water. Patient Instructions   Patient Education        Back Pain: Care Instructions  Your Care Instructions    Back pain has many possible causes. It is often related to problems with muscles and ligaments of the back.  It may also be related to problems with the nerves, discs, or bones of the back. Moving, lifting, standing, sitting, or sleeping in an awkward way can strain the back. Sometimes you don't notice the injury until later. Arthritis is another common cause of back pain. Although it may hurt a lot, back pain usually improves on its own within several weeks. Most people recover in 12 weeks or less. Using good home treatment and being careful not to stress your back can help you feel better sooner. Follow-up care is a key part of your treatment and safety. Be sure to make and go to all appointments, and call your doctor if you are having problems. Its also a good idea to know your test results and keep a list of the medicines you take. How can you care for yourself at home? · Sit or lie in positions that are most comfortable and reduce your pain. Try one of these positions when you lie down:  ¨ Lie on your back with your knees bent and supported by large pillows. ¨ Lie on the floor with your legs on the seat of a sofa or chair. [place] on your side with your knees and hips bent and a pillow between your legs. ¨ Lie on your stomach if it does not make pain worse. · Do not sit up in bed, and avoid soft couches and twisted positions. Bed rest can help relieve pain at first, but it delays healing. Avoid bed rest after the first day of back pain. · Change positions every 30 minutes. If you must sit for long periods of time, take breaks from sitting. Get up and walk around, or lie in a comfortable position. · Try using a heating pad on a low or medium setting for 15 to 20 minutes every 2 or 3 hours. Try a warm shower in place of one session with the heating pad. · You can also try an ice pack for 10 to 15 minutes every 2 to 3 hours. Put a thin cloth between the ice pack and your skin. · Take pain medicines exactly as directed. ¨ If the doctor gave you a prescription medicine for pain, take it as prescribed.   ¨ If you are not taking a prescription pain medicine, ask your doctor if you can take an over-the-counter medicine. · Take short walks several times a day. You can start with 5 to 10 minutes, 3 or 4 times a day, and work up to longer walks. Walk on level surfaces and avoid hills and stairs until your back is better. · Return to work and other activities as soon as you can. Continued rest without activity is usually not good for your back. · To prevent future back pain, do exercises to stretch and strengthen your back and stomach. Learn how to use good posture, safe lifting techniques, and proper body mechanics. When should you call for help? Call your doctor now or seek immediate medical care if:  · You have new or worsening numbness in your legs. · You have new or worsening weakness in your legs. (This could make it hard to stand up.)  · You lose control of your bladder or bowels. Watch closely for changes in your health, and be sure to contact your doctor if:  · Your pain gets worse. · You are not getting better after 2 weeks. Where can you learn more? Go to https://HERCAMOSHOP.[employer]kk[employer].fi. org and sign in to your [employer] account. Enter S188 in the Payward box to learn more about \"Back Pain: Care Instructions. \"     If you do not have an account, please click on the \"Sign Up Now\" link. Current as of: March 21, 2017  Content Version: 11.3  © 6703-2235 [employer]. Care instructions adapted under license by [facility] ([facility]). If you have questions about a medical condition or this instruction, always ask your healthcare professional. [place] [address] any warranty or liability for your use of this information.

## 2017-12-05 ENCOUNTER — OFFICE VISIT (OUTPATIENT)
Dept: FAMILY MEDICINE CLINIC | Facility: CLINIC | Age: 22
End: 2017-12-05

## 2017-12-05 VITALS
HEIGHT: 66 IN | TEMPERATURE: 98.2 F | RESPIRATION RATE: 16 BRPM | HEART RATE: 116 BPM | DIASTOLIC BLOOD PRESSURE: 72 MMHG | OXYGEN SATURATION: 98 % | WEIGHT: 154 LBS | BODY MASS INDEX: 24.75 KG/M2 | SYSTOLIC BLOOD PRESSURE: 104 MMHG

## 2017-12-05 DIAGNOSIS — F90.9 ATTENTION DEFICIT HYPERACTIVITY DISORDER (ADHD), UNSPECIFIED ADHD TYPE: ICD-10-CM

## 2017-12-05 DIAGNOSIS — R35.0 URINARY FREQUENCY: Primary | ICD-10-CM

## 2017-12-05 PROCEDURE — 99213 OFFICE O/P EST LOW 20 MIN: CPT | Performed by: FAMILY MEDICINE

## 2017-12-06 PROBLEM — Z01.89 LABORATORY TEST: Status: ACTIVE | Noted: 2017-12-06

## 2017-12-06 LAB
ALBUMIN SERPL-MCNC: 4.6 G/DL (ref 3.5–5)
ALBUMIN/GLOB SERPL: 1.8 G/DL (ref 1.1–2.5)
ALP SERPL-CCNC: 56 U/L (ref 24–120)
ALT SERPL-CCNC: 35 U/L (ref 0–54)
AST SERPL-CCNC: 19 U/L (ref 7–45)
BILIRUB SERPL-MCNC: 0.3 MG/DL (ref 0.1–1)
BUN SERPL-MCNC: 10 MG/DL (ref 5–21)
BUN/CREAT SERPL: 13.9 (ref 7–25)
CALCIUM SERPL-MCNC: 9.5 MG/DL (ref 8.4–10.4)
CHLORIDE SERPL-SCNC: 105 MMOL/L (ref 98–110)
CO2 SERPL-SCNC: 23 MMOL/L (ref 24–31)
CREAT SERPL-MCNC: 0.72 MG/DL (ref 0.5–1.4)
GFR SERPLBLD CREATININE-BSD FMLA CKD-EPI: 100 ML/MIN/1.73
GFR SERPLBLD CREATININE-BSD FMLA CKD-EPI: 122 ML/MIN/1.73
GLOBULIN SER CALC-MCNC: 2.6 GM/DL
GLUCOSE SERPL-MCNC: 80 MG/DL (ref 70–100)
HBA1C MFR BLD: 5.1 %
POTASSIUM SERPL-SCNC: 4.5 MMOL/L (ref 3.5–5.3)
PROT SERPL-MCNC: 7.2 G/DL (ref 6.3–8.7)
SODIUM SERPL-SCNC: 139 MMOL/L (ref 135–145)

## 2017-12-11 ENCOUNTER — TELEPHONE (OUTPATIENT)
Dept: FAMILY MEDICINE CLINIC | Facility: CLINIC | Age: 22
End: 2017-12-11

## 2017-12-11 DIAGNOSIS — L29.9 ITCHING: ICD-10-CM

## 2017-12-11 DIAGNOSIS — R21 RASH: ICD-10-CM

## 2017-12-11 DIAGNOSIS — J40 BRONCHITIS: ICD-10-CM

## 2017-12-11 RX ORDER — AZITHROMYCIN 250 MG/1
TABLET, FILM COATED ORAL
Qty: 6 TABLET | Refills: 0 | Status: SHIPPED | OUTPATIENT
Start: 2017-12-11 | End: 2018-01-11

## 2017-12-11 NOTE — TELEPHONE ENCOUNTER
"Vm \"I have sinus infection, runny nose, sinus drip, making me have a sore throat and feel bad\"    PHONE CALL-NURSE       Suzi Vasquez  1995    1. Your problem is, sinus infection, post nasal drip, occ cough    2. Onset first sxs , over the weekend    3. Fever, no    4. If yes, taken or felt feverish    5. How high has it gone    6. Coming down now    7. Wants to be seen, no    8. Prefers per phone, yes    9. Would come in if provider requests    10. Rx/Allergy list correct, yes    11. Cough, sometimes with drip    12. SOB, no    13. Wheezing, no    14. Nausea/Vomiting, no    15. Diarrhea,no    16. If female, any chance of pregnancy, no    17. If no chance of pregnancy/why, no on bc    18. Breast feeding, no    19. Anything else they want us to know    20. [facility] ,  [facility]    Confirm no SOB, uncontrolled fevers, vomiting, diarrhea; patient comfortable symptoms are mild at this point; if this is the case may have refill of last     Along with the Rx you were given today for your upper respiratory infection (URI); feel free to use these other suggestions to help with usual symptoms.   OTC analgesics as needed (tylenol and like)  OTC cough advised (robitussin DM and equal day and nyquil and equal at night)  OTC nasal spray (Afrin/like) 1-3 days advised as needed and no longer than that  OTC saline spray (ocean/nasal) to wash the sinus/nasal passages clear and keep them moist  Vaporizer as needed  Fluids emphasis encouraged; calories optional for few days  Rest till fatigue better      Magan, notified pt and stated understanding  "

## 2017-12-18 NOTE — PROGRESS NOTES
"Subjective   Suzi Vasquez is a 22 y.o. female presenting with chief complaint of:   Chief Complaint   Patient presents with   • ADHD   • Urinary Frequency     \"since I had the babies\"       History of Present Illness :  unaccompanied.  Here for primarily a/an Chronic issue today.   Has has  multiple chronic problems to consider: ADHD being one.     Other chronic problem/s to consider: none  Urinary frequency:  Has had for over a year; has need to void every 2 hrs; night/day.  No dysuria.  No hematuria.  Thinks she empties.  Nothing seems to be dropping.   ? Her ADHD Rx or something else.     Has an/another acute issue today: none    The following portions of the patient's history were reviewed and updated as appropriate: allergies, current medications, past family history, past medical history, past social history, past surgical history and problem list.  Records acquired and reviewed; TCC migrated.      Current Outpatient Prescriptions:   •  amphetamine-dextroamphetamine (ADDERALL) 15 MG tablet, Take 1 tablet by mouth Daily., Disp: 30 tablet, Rfl: 0  •  levonorgestrel (MIRENA, 52 MG,) 20 MCG/24HR IUD, BC IMPLANT, Disp: , Rfl:   •  azithromycin (ZITHROMAX Z-CHEVY) 250 MG tablet, Take 2 tablets the first day, then 1 tablet daily for 4 days., Disp: 6 tablet, Rfl: 0    No Known Allergies    Review of Systems  GENERAL:  Active home without limits. Sleep is ok. No fever now.  ENDO:  No syncope, near or diaphoretic sweaty spells.  HEENT: No head injury or headache,  No vision change, No hearing loss.  Ears without pain/drainage.  No sore throat.  No significant nasal/sinus congestion/drainage. No epistaxis.  CHEST: No chest wall tenderness or mass. No cough,  without wheeze.  No SOB; no hemoptysis.  CV: No chest pain, palpitations, ankle edema.  GI: No heartburn, dysphagia.  No abdominal pain, diarrhea, constipation.  No rectal bleeding, or melena.    :  Voids without dysuria, or incontinence to completion.  ORTHO: No " painful/swollen joints but various on /off sore.  No sore neck or back.  No acute neck or back pain without recent injury.   NEURO: No dizziness, weakness of extremities.  No numbness/paresthesias.   PSYCH: No memory loss.  Mood good; not anxious, depressed but/and not suicidal.  Tries to tolerate stress .   Rx helps a lot keep her focused.     Results for orders placed or performed in visit on 12/05/17   Comprehensive Metabolic Panel   Result Value Ref Range    Glucose 80 70 - 100 mg/dL    BUN 10 5 - 21 mg/dL    Creatinine 0.72 0.50 - 1.40 mg/dL    eGFR Non African Am 100 >60 mL/min/1.73    eGFR African Am 122 >60 mL/min/1.73    BUN/Creatinine Ratio 13.9 7.0 - 25.0    Sodium 139 135 - 145 mmol/L    Potassium 4.5 3.5 - 5.3 mmol/L    Chloride 105 98 - 110 mmol/L    Total CO2 23.0 (L) 24.0 - 31.0 mmol/L    Calcium 9.5 8.4 - 10.4 mg/dL    Total Protein 7.2 6.3 - 8.7 g/dL    Albumin 4.60 3.50 - 5.00 g/dL    Globulin 2.6 gm/dL    A/G Ratio 1.8 1.1 - 2.5 g/dL    Total Bilirubin 0.3 0.1 - 1.0 mg/dL    Alkaline Phosphatase 56 24 - 120 U/L    AST (SGOT) 19 7 - 45 U/L    ALT (SGPT) 35 0 - 54 U/L   Hemoglobin A1c   Result Value Ref Range    Hemoglobin A1C 5.10 %       Lab Results   Component Value Date    HGBA1C 5.10 12/05/2017       Lab Results   Component Value Date     12/05/2017    K 4.5 12/05/2017     12/05/2017    CO2 23.0 (L) 12/05/2017    BUN 10 12/05/2017    CREATININE 0.72 12/05/2017    CALCIUM 9.5 12/05/2017       No results found for: PSA     Lab Results:  CBC:  Lab Results - Last 18 Months  Lab Units 09/29/16  0032   WBC 10*3/mm3 9.93   HEMATOCRIT % 33.4*     CMP:  Lab Results - Last 18 Months  Lab Units 12/05/17  1429   SODIUM mmol/L 139   CHLORIDE mmol/L 105   TOTAL CO2, ARTERIAL mmol/L 23.0*   BUN mg/dL 10   CREATININE mg/dL 0.72   EGFR IF NONAFRICN AM mL/min/1.73 100   EGFR IF AFRICN AM mL/min/1.73 122   CALCIUM mg/dL 9.5     THYROID:No results for input(s): TSH, T3FREE, FREET4 in the last 75411  "hours.    Invalid input(s): T3, T4  A1C:  Lab Results - Last 18 Months  Lab Units 12/05/17  1429   HEMOGLOBIN A1C % 5.10       Objective   /72 (BP Location: Right arm, Patient Position: Sitting, Cuff Size: Adult)  Pulse 116  Temp 98.2 °F (36.8 °C) (Oral)   Resp 16  Ht 167.6 cm (66\")  Wt 69.9 kg (154 lb)  LMP 11/21/2017 (Approximate)  SpO2 98%  Breastfeeding? No Comment: Ines  BMI 24.86 kg/m2    Physical Exam  GENERAL:  Well nourished/developed in no acute distress.   SKIN: Turgor excellent, without wound, rash, lesion  HEENT: Normal cephalic without trauma.  Pupils equal round reactive to light. Extraocular motions full without nystagmus.   External canals nonobstructive nontender without reddness. Tymphatic membranes loly with gary structures intact.   Oral cavity without growths, exudates, and moist.  Posterior pharynx without mass, obstruction, redness.  No thyromegaly, mass, tenderness, lymphadenopathy and supple.  CV: Regular rhythm.  No murmur, gallop,  edema.  CHEST: No chest wall tenderness or mass.   LUNGS: Symmetric motion with clear to auscultation.   ABD: Soft, nontender without mass.   ORTHO: Symmetric extremities without swelling/point tenderness.  Full gross range of motion.  NEURO: CN 2-12 grossly intact.  Symmetric facies. 1/4 biceps knee equal reflexes.  UE/LE   4/5 strength throughout.  Nonfocal use extremities. Speech clear.  Intact light touch with monofilament, vibratory sensation with tuning fork; equal toes/distal feet.    PSYCH: Oriented x 3.  Pleasant calm, well kept.  Purposeful/directed conservation with intact short/long gross memory.     Assessment/Plan     1. Urinary frequency -nothing medically obvious as to cause   2. Attention deficit hyperactivity disorder (ADHD), unspecified ADHD type -doing well       Rx: reviewed/changes:  same    LAB: reviewed/orders:   Orders Placed This Encounter   Procedures   • Urinalysis With / Culture If Indicated - Urine, Clean Catch "   • Comprehensive Metabolic Panel   • Hemoglobin A1c     Discussions:   If labs neg; would want to see her gyne/or urology or even gyne/urology.     There are no Patient Instructions on file for this visit.    Follow up: Return for lab today;, Dr Acevedo-, 6 m;.  Future Appointments  Date Time Provider Department Center   6/5/2018 2:30 PM Jaylon Acevedo MD [facility] None

## 2018-01-11 ENCOUNTER — TELEPHONE (OUTPATIENT)
Dept: FAMILY MEDICINE CLINIC | Facility: CLINIC | Age: 23
End: 2018-01-11

## 2018-01-11 DIAGNOSIS — R11.2 NAUSEA AND VOMITING, INTRACTABILITY OF VOMITING NOT SPECIFIED, UNSPECIFIED VOMITING TYPE: Primary | ICD-10-CM

## 2018-01-11 RX ORDER — ONDANSETRON 4 MG/1
4 TABLET, FILM COATED ORAL EVERY 8 HOURS PRN
Qty: 12 TABLET | Refills: 0 | Status: SHIPPED | OUTPATIENT
Start: 2018-01-11 | End: 2018-05-14

## 2018-01-11 NOTE — TELEPHONE ENCOUNTER
"Vm \"she started throwing up around 3am and she had diarrhea at the same time, it has happened 3 more times, she does not have a fever, aching all over. And she is afraid, she isn't going to get over this and she will keep doing it?  "

## 2018-01-11 NOTE — TELEPHONE ENCOUNTER
If comes to ER/IV has to but  A. No solids/sip fluids  B. phenegran or zofran around the clock 36 hr  Explain; a lot going around and very likely she has one of these virus IF no fever.

## 2018-01-26 ENCOUNTER — TELEPHONE (OUTPATIENT)
Dept: OBGYN | Age: 23
End: 2018-01-26

## 2018-01-26 DIAGNOSIS — F90.9 ATTENTION DEFICIT HYPERACTIVITY DISORDER (ADHD), UNSPECIFIED ADHD TYPE: ICD-10-CM

## 2018-01-26 RX ORDER — DEXTROAMPHETAMINE SACCHARATE, AMPHETAMINE ASPARTATE, DEXTROAMPHETAMINE SULFATE AND AMPHETAMINE SULFATE 3.75; 3.75; 3.75; 3.75 MG/1; MG/1; MG/1; MG/1
15 TABLET ORAL DAILY
Qty: 30 TABLET | Refills: 0 | Status: SHIPPED | OUTPATIENT
Start: 2018-01-26 | End: 2018-03-07 | Stop reason: SDUPTHER

## 2018-01-26 NOTE — TELEPHONE ENCOUNTER
Pt thinks she is suppose to have Mirena changed out yearly. I could not find anything in the notes regarding this. She said that Dr Charley Toledo told her that.

## 2018-01-26 NOTE — TELEPHONE ENCOUNTER
LM on vm, told pt the Mirena can stay in for up to 5 years, no need to change out any sooner. Told to call office on Monday if any questions.

## 2018-01-26 NOTE — TELEPHONE ENCOUNTER
"Vm \"refill my adderall\"  Protocol last refill 11-28-17    [facility] ran  Unable to run [place] version  "

## 2018-03-07 DIAGNOSIS — F90.9 ATTENTION DEFICIT HYPERACTIVITY DISORDER (ADHD), UNSPECIFIED ADHD TYPE: ICD-10-CM

## 2018-03-07 RX ORDER — DEXTROAMPHETAMINE SACCHARATE, AMPHETAMINE ASPARTATE, DEXTROAMPHETAMINE SULFATE AND AMPHETAMINE SULFATE 3.75; 3.75; 3.75; 3.75 MG/1; MG/1; MG/1; MG/1
15 TABLET ORAL DAILY
Qty: 30 TABLET | Refills: 0 | Status: SHIPPED | OUTPATIENT
Start: 2018-03-07 | End: 2018-04-06 | Stop reason: SDUPTHER

## 2018-03-23 ENCOUNTER — TELEPHONE (OUTPATIENT)
Dept: FAMILY MEDICINE CLINIC | Facility: CLINIC | Age: 23
End: 2018-03-23

## 2018-03-23 RX ORDER — AMOXICILLIN 500 MG/1
500 CAPSULE ORAL 2 TIMES DAILY
Qty: 20 CAPSULE | Refills: 0 | Status: SHIPPED | OUTPATIENT
Start: 2018-03-23 | End: 2018-05-09

## 2018-03-23 NOTE — TELEPHONE ENCOUNTER
Pt called and states her ears are hurting really bad and grandma has looked in them and she has some redness and would like to have something called into [facility] and escribed

## 2018-04-05 ENCOUNTER — TELEPHONE (OUTPATIENT)
Dept: OBGYN | Age: 23
End: 2018-04-05

## 2018-04-06 DIAGNOSIS — R11.2 NAUSEA AND VOMITING, INTRACTABILITY OF VOMITING NOT SPECIFIED, UNSPECIFIED VOMITING TYPE: ICD-10-CM

## 2018-04-06 DIAGNOSIS — F90.9 ATTENTION DEFICIT HYPERACTIVITY DISORDER (ADHD), UNSPECIFIED ADHD TYPE: ICD-10-CM

## 2018-04-06 RX ORDER — DEXTROAMPHETAMINE SACCHARATE, AMPHETAMINE ASPARTATE, DEXTROAMPHETAMINE SULFATE AND AMPHETAMINE SULFATE 3.75; 3.75; 3.75; 3.75 MG/1; MG/1; MG/1; MG/1
15 TABLET ORAL DAILY
Qty: 30 TABLET | Refills: 0 | Status: SHIPPED | OUTPATIENT
Start: 2018-04-06 | End: 2018-05-09 | Stop reason: SDUPTHER

## 2018-04-06 RX ORDER — ONDANSETRON 4 MG/1
TABLET, FILM COATED ORAL
Qty: 12 TABLET | Refills: 0 | OUTPATIENT
Start: 2018-04-06

## 2018-04-06 NOTE — TELEPHONE ENCOUNTER
"Vm \"refill my adderall \"    Protocol last refill 3-7-18      [place] [facility] wnl's, no fills in [place]      "

## 2018-04-09 ENCOUNTER — OFFICE VISIT (OUTPATIENT)
Dept: OBGYN | Age: 23
End: 2018-04-09
Payer: MEDICAID

## 2018-04-09 ENCOUNTER — HOSPITAL ENCOUNTER (OUTPATIENT)
Dept: WOMENS IMAGING | Age: 23
Discharge: HOME | End: 2018-04-09
Payer: MEDICAID

## 2018-04-09 VITALS
DIASTOLIC BLOOD PRESSURE: 80 MMHG | HEIGHT: 66 IN | HEART RATE: 108 BPM | WEIGHT: 155 LBS | BODY MASS INDEX: 24.91 KG/M2 | SYSTOLIC BLOOD PRESSURE: 129 MMHG

## 2018-04-09 DIAGNOSIS — T83.32XA INTRAUTERINE CONTRACEPTIVE DEVICE THREADS LOST, INITIAL ENCOUNTER: Primary | ICD-10-CM

## 2018-04-09 DIAGNOSIS — T83.32XA INTRAUTERINE CONTRACEPTIVE DEVICE THREADS LOST, INITIAL ENCOUNTER: ICD-10-CM

## 2018-04-09 DIAGNOSIS — R10.2 PELVIC PAIN IN FEMALE: ICD-10-CM

## 2018-04-09 PROCEDURE — 76830 TRANSVAGINAL US NON-OB: CPT

## 2018-04-09 PROCEDURE — 99213 OFFICE O/P EST LOW 20 MIN: CPT | Performed by: NURSE PRACTITIONER

## 2018-04-09 ASSESSMENT — ENCOUNTER SYMPTOMS
ALLERGIC/IMMUNOLOGIC NEGATIVE: 1
CONSTIPATION: 0
GASTROINTESTINAL NEGATIVE: 1
DIARRHEA: 0
RESPIRATORY NEGATIVE: 1
EYES NEGATIVE: 1

## 2018-04-13 DIAGNOSIS — R10.2 PELVIC PAIN IN FEMALE: ICD-10-CM

## 2018-05-09 ENCOUNTER — HOSPITAL ENCOUNTER (EMERGENCY)
Facility: HOSPITAL | Age: 23
Discharge: HOME | End: 2018-05-09
Admitting: EMERGENCY MEDICINE

## 2018-05-09 ENCOUNTER — APPOINTMENT (OUTPATIENT)
Dept: GENERAL RADIOLOGY | Facility: HOSPITAL | Age: 23
End: 2018-05-09

## 2018-05-09 VITALS
DIASTOLIC BLOOD PRESSURE: 93 MMHG | TEMPERATURE: 98.2 F | RESPIRATION RATE: 12 BRPM | OXYGEN SATURATION: 97 % | HEART RATE: 99 BPM | SYSTOLIC BLOOD PRESSURE: 129 MMHG

## 2018-05-09 DIAGNOSIS — F41.0 PANIC ATTACK: ICD-10-CM

## 2018-05-09 DIAGNOSIS — R00.2 PALPITATIONS: Primary | ICD-10-CM

## 2018-05-09 DIAGNOSIS — F90.9 ATTENTION DEFICIT HYPERACTIVITY DISORDER (ADHD), UNSPECIFIED ADHD TYPE: ICD-10-CM

## 2018-05-09 LAB
ALBUMIN SERPL-MCNC: 4.7 G/DL (ref 3.5–5)
ALBUMIN/GLOB SERPL: 1.6 G/DL (ref 1.1–2.5)
ALP SERPL-CCNC: 59 U/L (ref 24–120)
ALT SERPL W P-5'-P-CCNC: 31 U/L (ref 0–54)
AMYLASE SERPL-CCNC: 83 U/L (ref 30–110)
ANION GAP SERPL CALCULATED.3IONS-SCNC: 15 MMOL/L (ref 4–13)
APTT PPP: 27.7 SECONDS (ref 24.1–34.8)
ARTERIAL PATENCY WRIST A: POSITIVE
AST SERPL-CCNC: 22 U/L (ref 7–45)
ATMOSPHERIC PRESS: 748 MMHG
BASE EXCESS BLDA CALC-SCNC: -2.8 MMOL/L (ref 0–2)
BASOPHILS # BLD AUTO: 0.05 10*3/MM3 (ref 0–0.2)
BASOPHILS NFR BLD AUTO: 0.8 % (ref 0–2)
BDY SITE: ABNORMAL
BILIRUB SERPL-MCNC: 0.4 MG/DL (ref 0.1–1)
BILIRUB UR QL STRIP: NEGATIVE
BODY TEMPERATURE: 37 C
BUN BLD-MCNC: 13 MG/DL (ref 5–21)
BUN/CREAT SERPL: 21.7 (ref 7–25)
CALCIUM SPEC-SCNC: 9.8 MG/DL (ref 8.4–10.4)
CHLORIDE SERPL-SCNC: 107 MMOL/L (ref 98–110)
CLARITY UR: CLEAR
CO2 SERPL-SCNC: 20 MMOL/L (ref 24–31)
COHGB MFR BLD: 0.7 % (ref 0–5)
COLOR UR: YELLOW
CREAT BLD-MCNC: 0.6 MG/DL (ref 0.5–1.4)
D DIMER PPP FEU-MCNC: 0.29 MG/L (FEU) (ref 0–0.5)
DEPRECATED RDW RBC AUTO: 38 FL (ref 40–54)
EOSINOPHIL # BLD AUTO: 0.02 10*3/MM3 (ref 0–0.7)
EOSINOPHIL NFR BLD AUTO: 0.3 % (ref 0–4)
ERYTHROCYTE [DISTWIDTH] IN BLOOD BY AUTOMATED COUNT: 12.5 % (ref 12–15)
GFR SERPL CREATININE-BSD FRML MDRD: 124 ML/MIN/1.73
GLOBULIN UR ELPH-MCNC: 3 GM/DL
GLUCOSE BLD-MCNC: 98 MG/DL (ref 70–100)
GLUCOSE UR STRIP-MCNC: NEGATIVE MG/DL
HCG SERPL QL: NEGATIVE
HCO3 BLDA-SCNC: 19 MMOL/L (ref 20–26)
HCT VFR BLD AUTO: 37.7 % (ref 37–47)
HCT VFR BLD CALC: 40.8 % (ref 38–51)
HGB BLD-MCNC: 13 G/DL (ref 12–16)
HGB BLDA-MCNC: 13.3 G/DL (ref 13.5–17.5)
HGB UR QL STRIP.AUTO: NEGATIVE
HOLD SPECIMEN: NORMAL
HOLD SPECIMEN: NORMAL
IMM GRANULOCYTES # BLD: 0.02 10*3/MM3 (ref 0–0.03)
IMM GRANULOCYTES NFR BLD: 0.3 % (ref 0–5)
INR PPP: 0.93 (ref 0.91–1.09)
KETONES UR QL STRIP: ABNORMAL
LEUKOCYTE ESTERASE UR QL STRIP.AUTO: NEGATIVE
LIPASE SERPL-CCNC: 64 U/L (ref 23–203)
LYMPHOCYTES # BLD AUTO: 1.4 10*3/MM3 (ref 0.72–4.86)
LYMPHOCYTES NFR BLD AUTO: 23 % (ref 15–45)
Lab: ABNORMAL
MCH RBC QN AUTO: 28.8 PG (ref 28–32)
MCHC RBC AUTO-ENTMCNC: 34.5 G/DL (ref 33–36)
MCV RBC AUTO: 83.4 FL (ref 82–98)
METHGB BLD QL: 0.8 % (ref 0–3)
MODALITY: ABNORMAL
MONOCYTES # BLD AUTO: 0.34 10*3/MM3 (ref 0.19–1.3)
MONOCYTES NFR BLD AUTO: 5.6 % (ref 4–12)
NEUTROPHILS # BLD AUTO: 4.25 10*3/MM3 (ref 1.87–8.4)
NEUTROPHILS NFR BLD AUTO: 70 % (ref 39–78)
NITRITE UR QL STRIP: NEGATIVE
NRBC BLD MANUAL-RTO: 0 /100 WBC (ref 0–0)
OXYHGB MFR BLDV: 98.1 % (ref 94–99)
PCO2 BLDA: 25.1 MM HG (ref 35–45)
PCO2 TEMP ADJ BLD: ABNORMAL MM HG (ref 35–45)
PH BLDA: 7.49 PH UNITS (ref 7.35–7.45)
PH UR STRIP.AUTO: 8.5 [PH] (ref 5–8)
PH, TEMP CORRECTED: ABNORMAL PH UNITS (ref 7.35–7.45)
PLATELET # BLD AUTO: 303 10*3/MM3 (ref 130–400)
PMV BLD AUTO: 10 FL (ref 6–12)
PO2 BLDA: 121 MM HG (ref 83–108)
PO2 TEMP ADJ BLD: ABNORMAL MM HG (ref 83–108)
POTASSIUM BLD-SCNC: 3.8 MMOL/L (ref 3.5–5.3)
POTASSIUM BLDA-SCNC: 4.1 MMOL/L (ref 3.5–5.2)
PROT SERPL-MCNC: 7.7 G/DL (ref 6.3–8.7)
PROT UR QL STRIP: NEGATIVE
PROTHROMBIN TIME: 12.7 SECONDS (ref 11.9–14.6)
RBC # BLD AUTO: 4.52 10*6/MM3 (ref 4.2–5.4)
SAO2 % BLDCOA: 99.6 % (ref 94–99)
SODIUM BLD-SCNC: 142 MMOL/L (ref 135–145)
SODIUM BLDA-SCNC: 140 MMOL/L (ref 136–145)
SP GR UR STRIP: 1.01 (ref 1–1.03)
TSH SERPL DL<=0.05 MIU/L-ACNC: 1.36 MIU/ML (ref 0.47–4.68)
UROBILINOGEN UR QL STRIP: ABNORMAL
VENTILATOR MODE: ABNORMAL
WBC NRBC COR # BLD: 6.08 10*3/MM3 (ref 4.8–10.8)
WHOLE BLOOD HOLD SPECIMEN: NORMAL
WHOLE BLOOD HOLD SPECIMEN: NORMAL

## 2018-05-09 PROCEDURE — 36600 WITHDRAWAL OF ARTERIAL BLOOD: CPT

## 2018-05-09 PROCEDURE — 82375 ASSAY CARBOXYHB QUANT: CPT

## 2018-05-09 PROCEDURE — 93005 ELECTROCARDIOGRAM TRACING: CPT | Performed by: NURSE PRACTITIONER

## 2018-05-09 PROCEDURE — 93010 ELECTROCARDIOGRAM REPORT: CPT | Performed by: INTERNAL MEDICINE

## 2018-05-09 PROCEDURE — 82150 ASSAY OF AMYLASE: CPT | Performed by: NURSE PRACTITIONER

## 2018-05-09 PROCEDURE — 83050 HGB METHEMOGLOBIN QUAN: CPT

## 2018-05-09 PROCEDURE — 80053 COMPREHEN METABOLIC PANEL: CPT | Performed by: NURSE PRACTITIONER

## 2018-05-09 PROCEDURE — 71045 X-RAY EXAM CHEST 1 VIEW: CPT

## 2018-05-09 PROCEDURE — 85379 FIBRIN DEGRADATION QUANT: CPT | Performed by: NURSE PRACTITIONER

## 2018-05-09 PROCEDURE — 84443 ASSAY THYROID STIM HORMONE: CPT | Performed by: NURSE PRACTITIONER

## 2018-05-09 PROCEDURE — 85730 THROMBOPLASTIN TIME PARTIAL: CPT | Performed by: NURSE PRACTITIONER

## 2018-05-09 PROCEDURE — 85610 PROTHROMBIN TIME: CPT | Performed by: NURSE PRACTITIONER

## 2018-05-09 PROCEDURE — 96374 THER/PROPH/DIAG INJ IV PUSH: CPT

## 2018-05-09 PROCEDURE — 82805 BLOOD GASES W/O2 SATURATION: CPT

## 2018-05-09 PROCEDURE — 85025 COMPLETE CBC W/AUTO DIFF WBC: CPT | Performed by: NURSE PRACTITIONER

## 2018-05-09 PROCEDURE — 99282 EMERGENCY DEPT VISIT SF MDM: CPT

## 2018-05-09 PROCEDURE — 81003 URINALYSIS AUTO W/O SCOPE: CPT | Performed by: NURSE PRACTITIONER

## 2018-05-09 PROCEDURE — 84703 CHORIONIC GONADOTROPIN ASSAY: CPT | Performed by: NURSE PRACTITIONER

## 2018-05-09 PROCEDURE — 83690 ASSAY OF LIPASE: CPT | Performed by: NURSE PRACTITIONER

## 2018-05-09 PROCEDURE — 25010000002 LORAZEPAM PER 2 MG: Performed by: NURSE PRACTITIONER

## 2018-05-09 RX ORDER — SODIUM CHLORIDE 0.9 % (FLUSH) 0.9 %
10 SYRINGE (ML) INJECTION AS NEEDED
Status: DISCONTINUED | OUTPATIENT
Start: 2018-05-09 | End: 2018-05-09 | Stop reason: HOSPADM

## 2018-05-09 RX ORDER — LORAZEPAM 2 MG/ML
1 INJECTION INTRAMUSCULAR ONCE
Status: COMPLETED | OUTPATIENT
Start: 2018-05-09 | End: 2018-05-09

## 2018-05-09 RX ADMIN — LORAZEPAM 1 MG: 2 INJECTION INTRAMUSCULAR; INTRAVENOUS at 14:02

## 2018-05-09 NOTE — ED NOTES
Patient discharged with AVS. AVS reviewed and patient given RX x0. All questions answered at this time. Vitals stable at discharge.        Jose Mcpherson RN  05/09/18 [id number]

## 2018-05-09 NOTE — TELEPHONE ENCOUNTER
"Vm \"refill her medication, we were told that she dont have to come in and sign for it, her adderall\"    Called and spoke to spouse,pt does have ER follow up appt and to check with pharmacy tomorrow    Protocol last refill 4-6-18    No fills in [place]   [facility] Hammer still running, will check in am for completion?    5-10-18 [facility] completed and WNL  "

## 2018-05-09 NOTE — ED PROVIDER NOTES
Subjective   Patient is a 22-year-old white female presents with palpitations and shortness of breath that started just a few hours prior to arrival.  Patient states that she was looking at some things on her computer, when she started having some chest pressure and shortness of breath.  Radiated into her neck.  She started hyperventilating after this and now is having some tingling and cramping to her arms and legs.  States she does have a history of panic attacks, although she has never had a panic attack last this long per her report.  She denies recent travel or surgery         History provided by:  Patient   used: No        Review of Systems   Constitutional: Negative.    HENT: Negative.    Eyes: Negative.    Respiratory: Negative.    Cardiovascular:        Patient is a 22-year-old white female presents with palpitations and shortness of breath that started just a few hours prior to arrival.  Patient states that she was looking at some things on her computer, when she started having some chest pressure and shortness of breath.  Radiated into her neck.  She started hyperventilating after this and now is having some tingling and cramping to her arms and legs.  States she does have a history of panic attacks, although she has never had a panic attack last this long per her report.  She denies recent travel or surgery      Gastrointestinal: Negative.    Endocrine: Negative.    Genitourinary: Negative.    Musculoskeletal: Negative.    Skin: Negative.    Allergic/Immunologic: Negative.    Neurological: Negative.    Hematological: Negative.    Psychiatric/Behavioral: Negative.    All other systems reviewed and are negative.      Past Medical History:   Diagnosis Date   • Anxiety        No Known Allergies    Past Surgical History:   Procedure Laterality Date   •  SECTION     • CYST REMOVAL     • HYMENECTOMY     • TONSILLECTOMY     • WISDOM TOOTH EXTRACTION         Family History   Problem  Relation Age of Onset   • Diabetes Paternal Grandmother        Social History     Social History   • Marital status:      Spouse name: Nicholas   • Number of children: 2   • Years of education: 13     Occupational History   • homemaker      Social History Main Topics   • Smoking status: Former Smoker     Quit date: 9/28/2013   • Smokeless tobacco: Never Used   • Alcohol use No   • Drug use: No   • Sexual activity: Yes     Partners: Male     Birth control/ protection: Implant     Other Topics Concern   • Not on file           Objective   Physical Exam   Constitutional: She is oriented to person, place, and time. She appears well-developed and well-nourished.   Very anxious    HENT:   Head: Normocephalic and atraumatic.   Eyes: Conjunctivae and EOM are normal. Pupils are equal, round, and reactive to light.   Neck: Normal range of motion. Neck supple. No tracheal deviation present. No thyromegaly present.   Cardiovascular: Regular rhythm, normal heart sounds and intact distal pulses.    Tachycardic at 120   Pulmonary/Chest: Effort normal and breath sounds normal. No respiratory distress. She has no wheezes. She has no rales. She exhibits no tenderness.   Abdominal: Soft. Bowel sounds are normal.   Musculoskeletal: Normal range of motion.   Neurological: She is alert and oriented to person, place, and time. She has normal reflexes. No cranial nerve deficit.   Skin: Skin is warm and dry.   Psychiatric: She has a normal mood and affect. Her behavior is normal. Judgment and thought content normal.   Nursing note and vitals reviewed.      Procedures           ED Course  ED Course   Comment By Time   Reeval pt- states that she is feeling much better. Pt eating at this time. Hr 99; b/p 150/79. Will discharged pt home to follow up with dr marley this week. Will order holter monitor and 2d echo as well. Advised to avoid caffeine - return if symptoms worsen Evelyn Rajput, APRN 05/09 1512                  [facility]  Number  of Diagnoses or Management Options  Palpitations: minor  Panic attack: minor     Amount and/or Complexity of Data Reviewed  Clinical lab tests: ordered and reviewed  Tests in the radiology section of CPT®: ordered and reviewed  Independent visualization of images, tracings, or specimens: yes    Patient Progress  Patient progress: stable        Final diagnoses:   Palpitations   Panic attack            Evelyn Rajput, APRN  05/09/18 [id number]

## 2018-05-09 NOTE — DISCHARGE INSTRUCTIONS
Return to ER if symptoms worsen       Cardiac Event Monitoring  A cardiac event monitor is a small recording device that is used to detect abnormal heart rhythms (arrhythmias). The monitor is used to record your heart rhythm when you have symptoms, such as:  · Fast heartbeats (palpitations), such as heart racing or fluttering.  · Dizziness.  · Fainting or light-headedness.  · Unexplained weakness.  Some monitors are wired to electrodes placed on your chest. Electrodes are flat, sticky disks that attach to your skin. Other monitors may be hand-held or worn on the wrist. The monitor can be worn for up to 30 days.  If the monitor is attached to your chest, a technician will prepare your chest for the electrode placement and show you how to work the monitor. Take time to practice using the monitor before you leave the office. Make sure you understand how to send the information from the monitor to your health care provider. In some cases, you may need to use a landline telephone instead of a cell phone.  What are the risks?  Generally, this device is safe to use, but it possible that the skin under the electrodes will become irritated.  How to use your cardiac event monitor  · Wear your monitor at all times, except when you are in water:  ¨ Do not let the monitor get wet.  ¨ Take the monitor off when you bathe. Do not swim or use a hot tub with it on.  · Keep your skin clean. Do not put body lotion or moisturizer on your chest.  · Change the electrodes as told by your health care provider or any time they stop sticking to your skin. You may need to use medical tape to keep them on.  · Try to put the electrodes in slightly different places on your chest to help prevent skin irritation. They must remain in the area under your left breast and in the upper right section of your chest.  · Make sure the monitor is safely clipped to your clothing or in a location close to your body that your health care provider  recommends.  · Press the button to record as soon as you feel heart-related symptoms, such as:  ¨ Dizziness.  ¨ Weakness.  ¨ Light-headedness.  ¨ Palpitations.  ¨ Thumping or pounding in your chest.  ¨ Shortness of breath.  ¨ Unexplained weakness.  · Keep a diary of your activities, such as walking, doing chores, and taking medicine. It is very important to note what you were doing when you pushed the button to record your symptoms. This will help your health care provider determine what might be contributing to your symptoms.  · Send the recorded information as recommended by your health care provider. It may take some time for your health care provider to process the results.  · Change the batteries as told by your health care provider.  · Keep electronic devices away from your monitor. This includes:  ¨ Tablets.  ¨ [employer] players.  ¨ Cell phones.  · While wearing your monitor you should avoid:  ¨ Electric blankets.  ¨ Electric razors.  ¨ Electric toothbrushes.  ¨ Microwave ovens.  ¨ Magnets.  ¨ Metal detectors.  Get help right away if:  · You have chest pain.  · You have extreme difficulty breathing or shortness of breath.  · You develop a very fast heartbeat that persists.  · You develop dizziness that does not go away.  · You faint or constantly feel like you are about to faint.  Summary  · A cardiac event monitor is a small recording device that is used to help detect abnormal heart rhythms (arrhythmias).  · The monitor is used to record your heart rhythm when you have heart-related symptoms.  · Make sure you understand how to send the information from the monitor to your health care provider.  · It is important to press the button on the monitor when you have any heart-related symptoms.  · Keep a diary of your activities, such as walking, doing chores, and taking medicine. It is very important to note what you were doing when you pushed the button to record your symptoms. This will help your health care provider  learn what might be causing your symptoms.  This information is not intended to replace advice given to you by your health care provider. Make sure you discuss any questions you have with your health care provider.  Document Released: 09/26/2009 Document Revised: 12/02/2017 Document Reviewed: 12/02/2017  [employer] Interactive Patient Education © 2017 [employer]      Holter Monitoring  A Holter monitor is a small device that is used to detect abnormal heart rhythms. It clips to your clothing and is connected by wires to flat, sticky disks (electrodes) that attach to your chest. It is worn continuously for 24-48 hours.  Follow these instructions at home:  · Wear your Holter monitor at all times, even while exercising and sleeping, for as long as directed by your health care provider.  · Make sure that the Holter monitor is safely clipped to your clothing or close to your body as recommended by your health care provider.  · Do not get the monitor or wires wet.  · Do not put body lotion or moisturizer on your chest.  · Keep your skin clean.  · Keep a diary of your daily activities, such as walking and doing chores. If you feel that your heartbeat is abnormal or that your heart is fluttering or skipping a beat:  ¨ Record what you are doing when it happens.  ¨ Record what time of day the symptoms occur.  · Return your Holter monitor as directed by your health care provider.  · Keep all follow-up visits as directed by your health care provider. This is important.  Get help right away if:  · You feel lightheaded or you faint.  · You have trouble breathing.  · You feel pain in your chest, upper arm, or jaw.  · You feel sick to your stomach and your skin is pale, cool, or damp.  · You heartbeat feels unusual or abnormal.  This information is not intended to replace advice given to you by your health care provider. Make sure you discuss any questions you have with your health care provider.  Document Released: 09/15/2005  Document Revised: 05/25/2017 Document Reviewed: 07/27/2015  [employer] Patient Education © 2017 [employer]      Panic Attacks  Panic attacks are sudden, short feelings of great fear or discomfort. You may have them for no reason when you are relaxed, when you are uneasy (anxious), or when you are sleeping.  Follow these instructions at home:  · Take all your medicines as told.  · Check with your doctor before starting new medicines.  · Keep all doctor visits.  Contact a doctor if:  · You are not able to take your medicines as told.  · Your symptoms do not get better.  · Your symptoms get worse.  Get help right away if:  · Your attacks seem different than your normal attacks.  · You have thoughts about hurting yourself or others.  · You take panic attack medicine and you have a side effect.  This information is not intended to replace advice given to you by your health care provider. Make sure you discuss any questions you have with your health care provider.  Document Released: 01/20/2012 Document Revised: 05/25/2017 Document Reviewed: 08/01/2014  [employer] Patient Education © 2017 [employer]      Palpitations  A palpitation is the feeling that your heart:  · Has an uneven (irregular) heartbeat.  · Is beating faster than normal.  · Is fluttering.  · Is skipping a beat.  This is usually not a serious problem. In some cases, you may need more medical tests.  Follow these instructions at home:  · Avoid:  ¨ Caffeine in coffee, tea, soft drinks, diet pills, and energy drinks.  ¨ Chocolate.  ¨ Alcohol.  · Do not use any tobacco products. These include cigarettes, chewing tobacco, and e-cigarettes. If you need help quitting, ask your doctor.  · Try to reduce your stress. These things may help:  ¨ Yoga.  ¨ Meditation.  ¨ Physical activity. Swimming, jogging, and walking are good choices.  ¨ A method that helps you use your mind to control things in your body, like heartbeats (biofeedback).  · Get  plenty of rest and sleep.  · Take over-the-counter and prescription medicines only as told by your doctor.  · Keep all follow-up visits as told by your doctor. This is important.  Contact a doctor if:  · Your heartbeat is still fast or uneven after 24 hours.  · Your palpitations occur more often.  Get help right away if:  · You have chest pain.  · You feel short of breath.  · You have a very bad headache.  · You feel dizzy.  · You pass out (faint).  This information is not intended to replace advice given to you by your health care provider. Make sure you discuss any questions you have with your health care provider.  Document Released: 09/26/2009 Document Revised: 05/25/2017 Document Reviewed: 09/01/2016  [employer] Interactive Patient Education © 2017 [employer]

## 2018-05-10 RX ORDER — DEXTROAMPHETAMINE SACCHARATE, AMPHETAMINE ASPARTATE, DEXTROAMPHETAMINE SULFATE AND AMPHETAMINE SULFATE 3.75; 3.75; 3.75; 3.75 MG/1; MG/1; MG/1; MG/1
15 TABLET ORAL DAILY
Qty: 30 TABLET | Refills: 0 | Status: SHIPPED | OUTPATIENT
Start: 2018-05-10 | End: 2018-06-11 | Stop reason: SDUPTHER

## 2018-05-14 ENCOUNTER — OFFICE VISIT (OUTPATIENT)
Dept: FAMILY MEDICINE CLINIC | Facility: CLINIC | Age: 23
End: 2018-05-14

## 2018-05-14 VITALS
TEMPERATURE: 98.4 F | HEART RATE: 94 BPM | OXYGEN SATURATION: 99 % | WEIGHT: 157 LBS | SYSTOLIC BLOOD PRESSURE: 112 MMHG | DIASTOLIC BLOOD PRESSURE: 68 MMHG | HEIGHT: 66 IN | RESPIRATION RATE: 18 BRPM | BODY MASS INDEX: 25.23 KG/M2

## 2018-05-14 DIAGNOSIS — R00.2 PALPITATIONS: ICD-10-CM

## 2018-05-14 DIAGNOSIS — F32.A DEPRESSION, UNSPECIFIED DEPRESSION TYPE: Primary | ICD-10-CM

## 2018-05-14 DIAGNOSIS — G47.00 INSOMNIA, UNSPECIFIED TYPE: ICD-10-CM

## 2018-05-14 DIAGNOSIS — R06.02 SHORTNESS OF BREATH: ICD-10-CM

## 2018-05-14 DIAGNOSIS — F41.9 ANXIETY: ICD-10-CM

## 2018-05-14 DIAGNOSIS — F90.9 ATTENTION DEFICIT HYPERACTIVITY DISORDER (ADHD), UNSPECIFIED ADHD TYPE: ICD-10-CM

## 2018-05-14 DIAGNOSIS — R06.4 HYPERVENTILATION: ICD-10-CM

## 2018-05-14 DIAGNOSIS — R07.9 CHEST PAIN, UNSPECIFIED TYPE: ICD-10-CM

## 2018-05-14 PROCEDURE — 99213 OFFICE O/P EST LOW 20 MIN: CPT | Performed by: FAMILY MEDICINE

## 2018-05-14 RX ORDER — ALPRAZOLAM 0.5 MG/1
0.5 TABLET ORAL 2 TIMES DAILY PRN
Qty: 20 TABLET | Refills: 0 | Status: SHIPPED | OUTPATIENT
Start: 2018-05-14 | End: 2018-06-11 | Stop reason: SDUPTHER

## 2018-05-14 RX ORDER — MIRTAZAPINE 15 MG/1
15 TABLET, FILM COATED ORAL NIGHTLY
Qty: 30 TABLET | Refills: 1 | Status: SHIPPED | OUTPATIENT
Start: 2018-05-14 | End: 2018-05-29

## 2018-05-14 RX ORDER — ALPRAZOLAM 0.5 MG/1
0.5 TABLET ORAL 2 TIMES DAILY PRN
Qty: 20 TABLET | Refills: 0 | Status: SHIPPED | OUTPATIENT
Start: 2018-05-14 | End: 2018-05-14 | Stop reason: SDUPTHER

## 2018-05-14 NOTE — PROGRESS NOTES
Subjective   Suzi Vasquez is a 22 y.o. female presenting with chief complaint of:   Chief Complaint   Patient presents with   • Panic Attack     [facility] ER F/U       History of Present Illness :  Alone.  Here for primarily an acute issue today; f/u  ED.  Had to call ambulance after sudden onset flushing, palpitations (see ED note)    ED NOTE:   Patient is a 22-year-old white female presents with palpitations and shortness of breath that started just a few hours prior to arrival.  Patient states that she was looking at some things on her computer, when she started having some chest pressure and shortness of breath.  Radiated into her neck.  She started hyperventilating after this and now is having some tingling and cramping to her arms and legs.  States she does have a history of panic attacks, although she has never had a panic attack last this long per her report.  She denies recent travel or surgery     She was given Rx in ED; sent home without.  No further spells.       Has few chronic problems to consider that might have a bearing on today's issues; not an interval appointment.       1. Depression, unspecified depression type    2. Anxiety    3. Attention deficit hyperactivity disorder (ADHD), unspecified ADHD type    4. Palpitations    5. Hyperventilation    6. Chest pain, unspecified type    7. Shortness of breath    8. Insomnia, unspecified type        Other chronic problem/s to consider:  Anxiety: This has been present for years/over a year.  It is chronic.  It had been stable.  It is associated with stressors: worse in the past.   Medications had not been on-going.   Medications/Rx change not requested.  Depression: This has been present for years/over a year.  It is chronic.  It had been stable.  It is associated with stressors: worse in the past. Medications/Rx change not requested.  No suicide ideation/intent.   ADHD:   Chronic.   Had been doing well with Rx and never had had problems with Rx before.      Has an/another acute issue today: none.    The following portions of the patient's history were reviewed and updated as appropriate: allergies, current medications, past family history, past medical history, past social history, past surgical history and problem list.  Records acquired and reviewed; TCC migrated.      Current Outpatient Prescriptions:   •  levonorgestrel (MIRENA, 52 MG,) 20 MCG/24HR IUD, BC IMPLANT, Disp: , Rfl:   •  amphetamine-dextroamphetamine (ADDERALL) 15 MG tablet, Take 1 tablet by mouth Daily., Disp: 30 tablet, Rfl: 0    No problems with medications.  Refills if needed done    No Known Allergies    Review of Systems  GENERAL:  Active home/with twins. Sleep is stable; often hard falling/staying.   Apnea denied. No fever now.  SKIN: No  rash/skin lesion of concern:   ENDO:  No syncope, near or diaphoretic sweaty spells.  HEENT: No recent head injury; or headache,  No vision change.  No hearing loss.  Ears without pain/drainage.  No sore throat.  No significant nasal/sinus congestion/drainage. No epistaxis.  CHEST: No chest wall tenderness or mass. No cough, without wheeze.  No SOB; no hemoptysis.  CV: No pattern chest pain, palpitations, ankle edema.  GI: No heartburn, dysphagia.  No abdominal pain, diarrhea, constipation.  No rectal bleeding, or melena.    :  Voids without dysuria, or  incontinence to completion.  ORTHO: No painful/swollen joints but various on /off sore.  No sore neck or back.  No acute neck or back pain without recent injury.  NEURO: No dizziness, weakness of extremities.  No numbness/paresthesias.   PSYCH: No memory loss.  Mood good; did not think she was anxious, depressed but/and not suicidal.  Tries to tolerate stress .     Results for orders placed or performed during the hospital encounter of 05/09/18   Comprehensive Metabolic Panel   Result Value Ref Range    Glucose 98 70 - 100 mg/dL    BUN 13 5 - 21 mg/dL    Creatinine 0.60 0.50 - 1.40 mg/dL    Sodium 142 135  - 145 mmol/L    Potassium 3.8 3.5 - 5.3 mmol/L    Chloride 107 98 - 110 mmol/L    CO2 20.0 (L) 24.0 - 31.0 mmol/L    Calcium 9.8 8.4 - 10.4 mg/dL    Total Protein 7.7 6.3 - 8.7 g/dL    Albumin 4.70 3.50 - 5.00 g/dL    ALT (SGPT) 31 0 - 54 U/L    AST (SGOT) 22 7 - 45 U/L    Alkaline Phosphatase 59 24 - 120 U/L    Total Bilirubin 0.4 0.1 - 1.0 mg/dL    eGFR Non African Amer 124 >60 mL/min/1.73    Globulin 3.0 gm/dL    A/G Ratio 1.6 1.1 - 2.5 g/dL    BUN/Creatinine Ratio 21.7 7.0 - 25.0    Anion Gap 15.0 (H) 4.0 - 13.0 mmol/L   Protime-INR   Result Value Ref Range    Protime 12.7 11.9 - 14.6 Seconds    INR 0.93 0.91 - 1.09   aPTT   Result Value Ref Range    PTT 27.7 24.1 - 34.8 seconds   Lipase   Result Value Ref Range    Lipase 64 23 - 203 U/L   Amylase   Result Value Ref Range    Amylase 83 30 - 110 U/L   hCG, Serum, Qualitative   Result Value Ref Range    HCG Qualitative Negative Negative   Urinalysis With / Culture If Indicated - Urine, Clean Catch   Result Value Ref Range    Color, UA Yellow Yellow, Straw    Appearance, UA Clear Clear    pH, UA 8.5 (H) 5.0 - 8.0    Specific Gravity, UA 1.013 1.005 - 1.030    Glucose, UA Negative Negative    Ketones, UA 15 mg/dL (1+) (A) Negative    Bilirubin, UA Negative Negative    Blood, UA Negative Negative    Protein, UA Negative Negative    Leuk Esterase, UA Negative Negative    Nitrite, UA Negative Negative    Urobilinogen, UA 0.2 E.U./dL 0.2 - 1.0 E.U./dL   TSH   Result Value Ref Range    TSH 1.360 0.470 - 4.680 mIU/mL   CBC Auto Differential   Result Value Ref Range    WBC 6.08 4.80 - 10.80 10*3/mm3    RBC 4.52 4.20 - 5.40 10*6/mm3    Hemoglobin 13.0 12.0 - 16.0 g/dL    Hematocrit 37.7 37.0 - 47.0 %    MCV 83.4 82.0 - 98.0 fL    MCH 28.8 28.0 - 32.0 pg    MCHC 34.5 33.0 - 36.0 g/dL    RDW 12.5 12.0 - 15.0 %    RDW-SD 38.0 (L) 40.0 - 54.0 fl    MPV 10.0 6.0 - 12.0 fL    Platelets 303 130 - 400 10*3/mm3    Neutrophil % 70.0 39.0 - 78.0 %    Lymphocyte % 23.0 15.0 - 45.0 %     Monocyte % 5.6 4.0 - 12.0 %    Eosinophil % 0.3 0.0 - 4.0 %    Basophil % 0.8 0.0 - 2.0 %    Immature Grans % 0.3 0.0 - 5.0 %    Neutrophils, Absolute 4.25 1.87 - 8.40 10*3/mm3    Lymphocytes, Absolute 1.40 0.72 - 4.86 10*3/mm3    Monocytes, Absolute 0.34 0.19 - 1.30 10*3/mm3    Eosinophils, Absolute 0.02 0.00 - 0.70 10*3/mm3    Basophils, Absolute 0.05 0.00 - 0.20 10*3/mm3    Immature Grans, Absolute 0.02 0.00 - 0.03 10*3/mm3    nRBC 0.0 0.0 - 0.0 /100 WBC   D-dimer, Quantitative   Result Value Ref Range    D-Dimer, Quantitative 0.29 0.00 - 0.50 mg/L (FEU)   Blood Gas, Arterial With Co-Ox   Result Value Ref Range    Site Right Radial     Ilya's Test Positive     pH, Arterial 7.487 (H) 7.350 - 7.450 pH units    pCO2, Arterial 25.1 (L) 35.0 - 45.0 mm Hg    pO2, Arterial 121.0 (H) 83.0 - 108.0 mm Hg    HCO3, Arterial 19.0 (L) 20.0 - 26.0 mmol/L    Base Excess, Arterial -2.8 (L) 0.0 - 2.0 mmol/L    O2 Saturation, Arterial 99.6 (H) 94.0 - 99.0 %    Hemoglobin, Blood Gas 13.3 (L) 13.5 - 17.5 g/dL    Hematocrit, Blood Gas 40.8 38.0 - 51.0 %    Oxyhemoglobin 98.1 94 - 99 %    Methemoglobin 0.80 0.00 - 3.00 %    Carboxyhemoglobin 0.7 0 - 5 %    Temperature 37.0 C    Sodium, Arterial 140 136 - 145 mmol/L    Potassium, Arterial 4.1 3.5 - 5.2 mmol/L    Barometric Pressure for Blood Gas 748 mmHg    Modality Room Air     Ventilator Mode NA     Collected by [id number]     pH, Temp Corrected  7.350 - 7.450 pH Units    pCO2, Temperature Corrected  35 - 45 mm Hg    pO2, Temperature Corrected  83 - 108 mm Hg   Light Blue Top   Result Value Ref Range    Extra Tube hold for add-on    Green Top (Gel)   Result Value Ref Range    Extra Tube Hold for add-ons.    Lavender Top   Result Value Ref Range    Extra Tube hold for add-on    Red Top   Result Value Ref Range    Extra Tube Hold for add-ons.        No results found for: PSA     Lab Results:  CBC:    Lab Results - Last 18 Months  Lab Units 05/09/18  1400   WBC 10*3/mm3 6.08   HEMOGLOBIN  "g/dL 13.0   HEMATOCRIT % 37.7   PLATELETS 10*3/mm3 303      BMP/CMP:    Lab Results - Last 18 Months  Lab Units 05/09/18  1510 05/09/18  1400 12/05/17  1429   SODIUM mmol/L  --  142 139   SODIUM, ARTERIAL mmol/L 140  --   --    POTASSIUM mmol/L  --  3.8 4.5   CHLORIDE mmol/L  --  107 105   CO2 mmol/L  --  20.0*  --    TOTAL CO2, ARTERIAL mmol/L  --   --  23.0*   GLUCOSE mg/dL  --   --  80   BUN mg/dL  --  13 10   CREATININE mg/dL  --  0.60 0.72   EGFR IF NONAFRICN AM mL/min/1.73  --  124 100   EGFR IF AFRICN AM mL/min/1.73  --   --  122   CALCIUM mg/dL  --  9.8 9.5     HEPATIC:    Lab Results - Last 18 Months  Lab Units 05/09/18  1400 12/05/17  1429   ALT (SGPT) U/L 31 35   AST (SGOT) U/L 22 19   ALK PHOS U/L 59 56     THYROID:    Lab Results - Last 18 Months  Lab Units 05/09/18  1400   TSH mIU/mL 1.360     A1C:    Lab Results - Last 18 Months  Lab Units 12/05/17  1429   HEMOGLOBIN A1C % 5.10     PSA:No results for input(s): PSA in the last 74473 hours.    Objective   /68   Pulse 94   Temp 98.4 °F (36.9 °C) (Oral)   Resp 18   Ht 167.6 cm (66\")   Wt 71.2 kg (157 lb)   SpO2 99%   Breastfeeding? No   BMI 25.34 kg/m²   Body mass index is 25.34 kg/m².    Physical Exam  GENERAL:  Well nourished/developed in no acute distress.   SKIN: Turgor excellent, without wound, rash, lesion.  HEENT: Normal cephalic without trauma.  Pupils equal round reactive to light. Extraocular motions full without nystagmus.   External canals nonobstructive nontender without reddness. Tymphatic membranes loly with gary structures intact.   Oral cavity without growths, exudates, and moist.  Posterior pharynx without mass, obstruction, redness.  No thyromegaly, mass, tenderness, lymphadenopathy and supple.  CV: Regular rhythm.  No murmur, gallop,  edema. Posterior pulses intact.  No carotid bruits.  CHEST: No chest wall tenderness or mass.   LUNGS: Symmetric motion with clear to auscultation.  No dullness to percussion  ABD: Soft, " nontender without mass.   ORTHO: Symmetric extremities without swelling/point tenderness.  Full gross range of motion.  NEURO: CN 2-12 grossly intact.  Symmetric facies and UE/LE. 3/5 strength throughout. 1/4 x bicep equal reflexes.  Nonfocal use extremities. Speech clear.  Intact light touch with monofilament, vibratory sensation with tuning fork; equal toes/distal feet.    PSYCH:  Oriented x 3.  Pleasant calm, well kept.  Purposeful/directed conservation with intact short/long gross memory.     Assessment/Plan     1. Depression, unspecified depression type    2. Anxiety    3. Attention deficit hyperactivity disorder (ADHD), unspecified ADHD type    4. Palpitations    5. Hyperventilation    6. Chest pain, unspecified type    7. Shortness of breath    8. Insomnia, unspecified type        Rx: reviewed/changes:  Add remeron 15 hs  Add xanax 0.5 bid prn    LAB/Testing/Referrals: reviewed/orders:   Today:   No orders of the defined types were placed in this encounter.    Usual:   12m CBC, CMP, LIPID, TSH, fT4    Discussions:   Body mass index is 25.34 kg/m².   Patient's Body mass index is 25.34 kg/m². BMI is within normal parameters. No follow-up required.  Non-smoker    There are no Patient Instructions on file for this visit.    Follow up: Return for after 5.24/holter back. .  Future Appointments  Date Time Provider Department Center   5/24/2018 10:00 AM [facility] ECHO ROOM 1  [facility]   5/24/2018 11:00 AM [facility] HOLTER ROOM  [facility]   5/29/2018 1:15 PM Jaylon Acevedo MD [facility] None   6/5/2018 2:30 PM MD [facility] None

## 2018-05-15 NOTE — ED NOTES
"ED Call Back Questions    1. How are you doing since leaving the Emergency Department?  Doing better, doing the monitor    2. Do you have any questions about your discharge instructions? No     3. Have you filled your new prescriptions yet? N/A  a. Do you have any questions about those medications? N/A    4. Were you able to make a follow-up appointment with the physician? Yes     5. Do you have a primary care physician? Yes   a. If No, would you like for me to set you up with one? N/A  i. If Yes, “I will have our ED  give you a call right back at this number to work with you on the best time for an appointment.”    6. We are always looking to get better at what we do. Do you have any suggestions for what we can do to be even better? N/A  a. If Yes, \"Thank you for sharing your concerns. I apologize. I will follow up with our manager and patient . Would you like someone to call you back?\" N/A    7. Is there anything else I can do for you? N/A visit was good       Raf Weston  05/15/18 [id number]    "

## 2018-05-24 ENCOUNTER — HOSPITAL ENCOUNTER (OUTPATIENT)
Dept: CARDIOLOGY | Facility: HOSPITAL | Age: 23
Discharge: HOME | End: 2018-05-24

## 2018-05-24 ENCOUNTER — HOSPITAL ENCOUNTER (OUTPATIENT)
Dept: CARDIOLOGY | Facility: HOSPITAL | Age: 23
Discharge: HOME | End: 2018-05-24
Admitting: NURSE PRACTITIONER

## 2018-05-24 DIAGNOSIS — R00.2 PALPITATIONS: ICD-10-CM

## 2018-05-24 PROCEDURE — 93225 XTRNL ECG REC<48 HRS REC: CPT

## 2018-05-24 PROCEDURE — 93306 TTE W/DOPPLER COMPLETE: CPT

## 2018-05-24 PROCEDURE — 93226 XTRNL ECG REC<48 HR SCAN A/R: CPT

## 2018-05-24 PROCEDURE — 93306 TTE W/DOPPLER COMPLETE: CPT | Performed by: INTERNAL MEDICINE

## 2018-05-25 PROBLEM — I49.9 CARDIAC ARRHYTHMIA: Status: ACTIVE | Noted: 2018-05-25

## 2018-05-25 LAB
BH CV ECHO MEAS - AO MAX PG (FULL): 2.3 MMHG
BH CV ECHO MEAS - AO MAX PG: 4.2 MMHG
BH CV ECHO MEAS - AO MEAN PG (FULL): 1 MMHG
BH CV ECHO MEAS - AO MEAN PG: 2 MMHG
BH CV ECHO MEAS - AO ROOT AREA (BSA CORRECTED): 1.6
BH CV ECHO MEAS - AO ROOT AREA: 6.6 CM^2
BH CV ECHO MEAS - AO ROOT DIAM: 2.9 CM
BH CV ECHO MEAS - AO V2 MAX: 102 CM/SEC
BH CV ECHO MEAS - AO V2 MEAN: 67.7 CM/SEC
BH CV ECHO MEAS - AO V2 VTI: 19.3 CM
BH CV ECHO MEAS - AVA(I,A): 1.7 CM^2
BH CV ECHO MEAS - AVA(I,D): 1.7 CM^2
BH CV ECHO MEAS - AVA(V,A): 2.1 CM^2
BH CV ECHO MEAS - AVA(V,D): 2.1 CM^2
BH CV ECHO MEAS - BSA(HAYCOCK): 1.8 M^2
BH CV ECHO MEAS - BSA: 1.8 M^2
BH CV ECHO MEAS - BZI_BMI: 25.3 KILOGRAMS/M^2
BH CV ECHO MEAS - BZI_METRIC_HEIGHT: 167.6 CM
BH CV ECHO MEAS - BZI_METRIC_WEIGHT: 71.2 KG
BH CV ECHO MEAS - CONTRAST EF 4CH: 63.3 ML/M^2
BH CV ECHO MEAS - EDV(CUBED): 64.5 ML
BH CV ECHO MEAS - EDV(MOD-SP4): 60.3 ML
BH CV ECHO MEAS - EDV(TEICH): 70.4 ML
BH CV ECHO MEAS - EF(CUBED): 78 %
BH CV ECHO MEAS - EF(MOD-SP4): 63.3 %
BH CV ECHO MEAS - EF(TEICH): 70.8 %
BH CV ECHO MEAS - ESV(CUBED): 14.2 ML
BH CV ECHO MEAS - ESV(MOD-SP4): 22.1 ML
BH CV ECHO MEAS - ESV(TEICH): 20.6 ML
BH CV ECHO MEAS - FS: 39.7 %
BH CV ECHO MEAS - IVS/LVPW: 0.86
BH CV ECHO MEAS - IVSD: 0.71 CM
BH CV ECHO MEAS - LA DIMENSION: 3 CM
BH CV ECHO MEAS - LA/AO: 1
BH CV ECHO MEAS - LAT PEAK E' VEL: 17.4 CM/SEC
BH CV ECHO MEAS - LV DIASTOLIC VOL/BSA (35-75): 33.4 ML/M^2
BH CV ECHO MEAS - LV MASS(C)D: 89.3 GRAMS
BH CV ECHO MEAS - LV MASS(C)DI: 49.5 GRAMS/M^2
BH CV ECHO MEAS - LV MAX PG: 1.9 MMHG
BH CV ECHO MEAS - LV MEAN PG: 1 MMHG
BH CV ECHO MEAS - LV SYSTOLIC VOL/BSA (12-30): 12.2 ML/M^2
BH CV ECHO MEAS - LV V1 MAX: 68.7 CM/SEC
BH CV ECHO MEAS - LV V1 MEAN: 40.2 CM/SEC
BH CV ECHO MEAS - LV V1 VTI: 10.6 CM
BH CV ECHO MEAS - LVIDD: 4 CM
BH CV ECHO MEAS - LVIDS: 2.4 CM
BH CV ECHO MEAS - LVLD AP4: 7.5 CM
BH CV ECHO MEAS - LVLS AP4: 6.7 CM
BH CV ECHO MEAS - LVOT AREA (M): 3.1 CM^2
BH CV ECHO MEAS - LVOT AREA: 3.1 CM^2
BH CV ECHO MEAS - LVOT DIAM: 2 CM
BH CV ECHO MEAS - LVPWD: 0.83 CM
BH CV ECHO MEAS - MV A MAX VEL: 52.9 CM/SEC
BH CV ECHO MEAS - MV DEC TIME: 0.18 SEC
BH CV ECHO MEAS - MV E MAX VEL: 79.5 CM/SEC
BH CV ECHO MEAS - MV E/A: 1.5
BH CV ECHO MEAS - SI(AO): 70.6 ML/M^2
BH CV ECHO MEAS - SI(CUBED): 27.9 ML/M^2
BH CV ECHO MEAS - SI(LVOT): 18.5 ML/M^2
BH CV ECHO MEAS - SI(MOD-SP4): 21.2 ML/M^2
BH CV ECHO MEAS - SI(TEICH): 27.6 ML/M^2
BH CV ECHO MEAS - SV(AO): 127.5 ML
BH CV ECHO MEAS - SV(CUBED): 50.3 ML
BH CV ECHO MEAS - SV(LVOT): 33.3 ML
BH CV ECHO MEAS - SV(MOD-SP4): 38.2 ML
BH CV ECHO MEAS - SV(TEICH): 49.8 ML
LEFT ATRIUM VOLUME INDEX: 16.5 ML/M2
MAXIMAL PREDICTED HEART RATE: 197 BPM
STRESS TARGET HR: 167 BPM

## 2018-05-29 ENCOUNTER — OFFICE VISIT (OUTPATIENT)
Dept: FAMILY MEDICINE CLINIC | Facility: CLINIC | Age: 23
End: 2018-05-29

## 2018-05-29 VITALS
HEIGHT: 66 IN | SYSTOLIC BLOOD PRESSURE: 130 MMHG | TEMPERATURE: 99.2 F | BODY MASS INDEX: 26.2 KG/M2 | DIASTOLIC BLOOD PRESSURE: 60 MMHG | WEIGHT: 163 LBS | RESPIRATION RATE: 18 BRPM | OXYGEN SATURATION: 99 % | HEART RATE: 130 BPM

## 2018-05-29 DIAGNOSIS — F41.9 ANXIETY: Primary | ICD-10-CM

## 2018-05-29 DIAGNOSIS — I49.9 CARDIAC ARRHYTHMIA, UNSPECIFIED CARDIAC ARRHYTHMIA TYPE: ICD-10-CM

## 2018-05-29 DIAGNOSIS — G47.00 INSOMNIA, UNSPECIFIED TYPE: ICD-10-CM

## 2018-05-29 DIAGNOSIS — F32.A DEPRESSION, UNSPECIFIED DEPRESSION TYPE: ICD-10-CM

## 2018-05-29 PROCEDURE — 99214 OFFICE O/P EST MOD 30 MIN: CPT | Performed by: FAMILY MEDICINE

## 2018-05-29 RX ORDER — BUPROPION HYDROCHLORIDE 150 MG/1
150 TABLET ORAL DAILY
Qty: 30 TABLET | Refills: 2 | Status: SHIPPED | OUTPATIENT
Start: 2018-05-29 | End: 2018-09-13 | Stop reason: SDUPTHER

## 2018-05-29 RX ORDER — CLONAZEPAM 0.5 MG/1
0.5 TABLET ORAL 2 TIMES DAILY PRN
Qty: 30 TABLET | Refills: 0 | Status: SHIPPED | OUTPATIENT
Start: 2018-05-29 | End: 2018-07-02 | Stop reason: SDUPTHER

## 2018-05-29 NOTE — PROGRESS NOTES
Subjective   Suzi Capone is a 22 y.o. female presenting with chief complaint of:   Chief Complaint   Patient presents with   • Panic Attack   • Palpitations       History of Present Illness :  Alone.   Has few chronic problems to consider that might have a bearing on today's issues;  an interval appointment.       Chronic/acute problems to review today:   1. Anxiety chronic/acute.  Recent increase in even panic anxiety.  Sleeping better with remeron; xanax used twice and helped.   going to [employer] to try out a job; she may be moving (more stress)   2. Insomnia, unspecified type chronic/acute; see above.    3. Depression, unspecified depression type chronic/acute; see above. Nonsuicidal.     4. Cardiac arrhythmia, unspecified cardiac arrhythmia type : recent holter; still pending. No syncope/near.      5/2018  · Left ventricular systolic function is normal. Estimated ejection fraction is 61-65%.  · Normal right ventricular cavity size and systolic function noted.  · All valves are structurally and functionally normal with no hemodynamically significant disease.  Holter done; pending    Has an/another acute issue today: none.    The following portions of the patient's history were reviewed and updated as appropriate: allergies, current medications, past family history, past medical history, past social history, past surgical history and problem list.  Records acquired and reviewed; TCC migrated.      Current Outpatient Prescriptions:   •  ALPRAZolam (XANAX) 0.5 MG tablet, Take 1 tablet by mouth 2 (Two) Times a Day As Needed for Anxiety., Disp: 20 tablet, Rfl: 0  •  amphetamine-dextroamphetamine (ADDERALL) 15 MG tablet, Take 1 tablet by mouth Daily., Disp: 30 tablet, Rfl: 0  •  levonorgestrel (MIRENA, 52 MG,) 20 MCG/24HR IUD, BC IMPLANT, Disp: , Rfl:     remeron 15 HS    No problems with medications.  Refills if needed done    No Known Allergies    Review of Systems  GENERAL:  Active home/with twins.  Sleep is stable; often hard falling/staying.   Apnea denied. No fever now.  SKIN: No  rash/skin lesion of concern:   ENDO:  No syncope, near or diaphoretic sweaty spells.  HEENT: No recent head injury; or headache,  No vision change.  No hearing loss.  Ears without pain/drainage.  No sore throat.  No significant nasal/sinus congestion/drainage. No epistaxis.  CHEST: No chest wall tenderness or mass. No cough, without wheeze.  No SOB; no hemoptysis.  CV: No pattern chest pain, palpitations, ankle edema.  GI: No heartburn, dysphagia.  No abdominal pain, diarrhea, constipation.  No rectal bleeding, or melena.    :  Voids without dysuria, or  incontinence to completion.  ORTHO: No painful/swollen joints but various on /off sore.  No sore neck or back.  No acute neck or back pain without recent injury.  NEURO: No dizziness, weakness of extremities.  No numbness/paresthesias.   PSYCH: No memory loss.  Mood good; occ anxious, mildly depressed but/and not suicidal.  Tries to tolerate stress .       Results for orders placed or performed during the hospital encounter of 05/24/18   Adult Transthoracic Echo Complete W/ Cont if Necessary Per Protocol   Result Value Ref Range    BSA 1.8 m^2    IVSd 0.71 cm    LVIDd 4.0 cm    LVIDs 2.4 cm    LVPWd 0.83 cm    IVS/LVPW 0.86     FS 39.7 %    EDV(Teich) 70.4 ml    ESV(Teich) 20.6 ml    EF(Teich) 70.8 %    EDV(cubed) 64.5 ml    ESV(cubed) 14.2 ml    EF(cubed) 78.0 %    LV mass(C)d 89.3 grams    LV mass(C)dI 49.5 grams/m^2    SV(Teich) 49.8 ml    SI(Teich) 27.6 ml/m^2    SV(cubed) 50.3 ml    SI(cubed) 27.9 ml/m^2    Ao root diam 2.9 cm    Ao root area 6.6 cm^2    LA dimension 3.0 cm    LA/Ao 1.0     LVOT diam 2.0 cm    LVOT area 3.1 cm^2    LVOT area(traced) 3.1 cm^2    LVLd ap4 7.5 cm    EDV(MOD-sp4) 60.3 ml    LVLs ap4 6.7 cm    ESV(MOD-sp4) 22.1 ml    EF(MOD-sp4) 63.3 %    SV(MOD-sp4) 38.2 ml    SI(MOD-sp4) 21.2 ml/m^2    Ao root area (BSA corrected) 1.6     CONTRAST EF 4CH 63.3  ml/m^2    LV Diastolic corrected for BSA 33.4 ml/m^2    LV Systolic corrected for BSA 12.2 ml/m^2    MV E max brandon 79.5 cm/sec    MV A max brandon 52.9 cm/sec    MV E/A 1.5     MV dec time 0.18 sec    Ao pk brandon 102.0 cm/sec    Ao max PG 4.2 mmHg    Ao max PG (full) 2.3 mmHg    Ao V2 mean 67.7 cm/sec    Ao mean PG 2.0 mmHg    Ao mean PG (full) 1.0 mmHg    Ao V2 VTI 19.3 cm    MIMI(I,A) 1.7 cm^2    MIMI(I,D) 1.7 cm^2    MIMI(V,A) 2.1 cm^2    MIMI(V,D) 2.1 cm^2    LV V1 max PG 1.9 mmHg    LV V1 mean PG 1.0 mmHg    LV V1 max 68.7 cm/sec    LV V1 mean 40.2 cm/sec    LV V1 VTI 10.6 cm    SV(Ao) 127.5 ml    SI(Ao) 70.6 ml/m^2    SV(LVOT) 33.3 ml    SI(LVOT) 18.5 ml/m^2     CV ECHO GRACIELA - BZI_BMI 25.3 kilograms/m^2     CV ECHO GRACIELA - BSA(HAYCOCK) 1.8 m^2     CV ECHO GRACIELA - BZI_METRIC_WEIGHT 71.2 kg     CV ECHO GRACIELA - BZI_METRIC_HEIGHT 167.6 cm    Target HR (85%) 167 bpm    Max. Pred. HR (100%) 197 bpm    LA Volume Index 16.5 mL/m2    Lat Peak E' Brandon 17.4 cm/sec       No results found for: PSA     Lab Results:  CBC:    Lab Results - Last 18 Months  Lab Units 05/09/18  1400   WBC 10*3/mm3 6.08   HEMOGLOBIN g/dL 13.0   HEMATOCRIT % 37.7   PLATELETS 10*3/mm3 303      BMP/CMP:    Lab Results - Last 18 Months  Lab Units 05/09/18  1510 05/09/18  1400 12/05/17  1429   SODIUM mmol/L  --  142 139   SODIUM, ARTERIAL mmol/L 140  --   --    POTASSIUM mmol/L  --  3.8 4.5   CHLORIDE mmol/L  --  107 105   CO2 mmol/L  --  20.0*  --    TOTAL CO2, ARTERIAL mmol/L  --   --  23.0*   GLUCOSE mg/dL  --   --  80   BUN mg/dL  --  13 10   CREATININE mg/dL  --  0.60 0.72   EGFR IF NONAFRICN AM mL/min/1.73  --  124 100   EGFR IF AFRICN AM mL/min/1.73  --   --  122   CALCIUM mg/dL  --  9.8 9.5     HEPATIC:    Lab Results - Last 18 Months  Lab Units 05/09/18  1400 12/05/17  1429   ALT (SGPT) U/L 31 35   AST (SGOT) U/L 22 19   ALK PHOS U/L 59 56     THYROID:    Lab Results - Last 18 Months  Lab Units 05/09/18  1400   TSH mIU/mL 1.360     A1C:    Lab  "Results - Last 18 Months  Lab Units 12/05/17  1429   HEMOGLOBIN A1C % 5.10     PSA:No results for input(s): PSA in the last 10958 hours.    Objective   /60   Pulse (!) 130   Temp 99.2 °F (37.3 °C) (Oral)   Resp 18   Ht 167.6 cm (66\")   Wt 73.9 kg (163 lb)   SpO2 99%   Breastfeeding? No   BMI 26.31 kg/m²   Body mass index is 26.31 kg/m².    Physical Exam  GENERAL:  Well nourished/developed in no acute distress.   SKIN: Turgor excellent.  HEENT: Normal cephalic without trauma.  Pupils equal round reactive to light. Extraocular motions full without nystagmus.   No thyromegaly, mass, tenderness, lymphadenopathy and supple.  CV: Regular rhythm.  No murmur, gallop,  edema.   CHEST: No chest wall tenderness or mass.   LUNGS: Symmetric motion with clear to auscultation.   ABD: Soft, nontender without mass.   ORTHO: Symmetric extremities without swelling/point tenderness.  Full gross range of motion.  NEURO: CN 2-12 grossly intact.  Symmetric facies and UE/LE. 3/5 strength throughout. 1/4 x bicep equal reflexes.  Nonfocal use extremities. Speech clear.  Intact light touch with monofilament, vibratory sensation with tuning fork; equal toes/distal feet.    PSYCH:  Oriented x 3.  Pleasant calm, well kept.  Purposeful/directed conservation with intact short/long gross memory.        Assessment/Plan     1. Anxiety    2. Insomnia, unspecified type    3. Depression, unspecified depression type    4. Cardiac arrhythmia, unspecified cardiac arrhythmia type -yet not proven as arrthymia       Rx: reviewed/changes:  Stop remeron  Start wellbutrin 150 CD/equal one a day  Start klonopin 0.5 HS  Offer counselor: that insurance would ok.      LAB/Testing/Referrals: reviewed/orders:   Today:   Orders Placed This Encounter   Procedures   • Ambulatory Referral to Behavioral Health     Usual:   12m CBC, CMP, LIPID, TSH, fT4    Discussions:   Body mass index is 26.31 kg/m².   Patient's Body mass index is 26.31 kg/m². BMI is within " normal parameters. No follow-up required.  Non-smoker      There are no Patient Instructions on file for this visit.    Follow up: Return for Dr Acevedo-.  Future Appointments  Date Time Provider Department Center   6/5/2018 2:30 PM Jaylon Acevedo MD [facility] None

## 2018-05-30 PROCEDURE — 93227 XTRNL ECG REC<48 HR R&I: CPT | Performed by: INTERNAL MEDICINE

## 2018-06-11 DIAGNOSIS — F41.9 ANXIETY: ICD-10-CM

## 2018-06-11 DIAGNOSIS — F90.9 ATTENTION DEFICIT HYPERACTIVITY DISORDER (ADHD), UNSPECIFIED ADHD TYPE: ICD-10-CM

## 2018-06-11 RX ORDER — ALPRAZOLAM 0.5 MG/1
0.5 TABLET ORAL 2 TIMES DAILY PRN
Qty: 20 TABLET | Refills: 0 | Status: SHIPPED | OUTPATIENT
Start: 2018-06-11 | End: 2018-07-11 | Stop reason: SDUPTHER

## 2018-06-11 RX ORDER — DEXTROAMPHETAMINE SACCHARATE, AMPHETAMINE ASPARTATE, DEXTROAMPHETAMINE SULFATE AND AMPHETAMINE SULFATE 3.75; 3.75; 3.75; 3.75 MG/1; MG/1; MG/1; MG/1
15 TABLET ORAL DAILY
Qty: 30 TABLET | Refills: 0 | Status: SHIPPED | OUTPATIENT
Start: 2018-06-11 | End: 2018-07-13 | Stop reason: SDUPTHER

## 2018-06-17 ENCOUNTER — HOSPITAL ENCOUNTER (EMERGENCY)
Facility: HOSPITAL | Age: 23
Discharge: HOME | End: 2018-06-17
Attending: EMERGENCY MEDICINE | Admitting: EMERGENCY MEDICINE

## 2018-06-17 VITALS
OXYGEN SATURATION: 100 % | HEIGHT: 66 IN | WEIGHT: 165 LBS | DIASTOLIC BLOOD PRESSURE: 70 MMHG | RESPIRATION RATE: 19 BRPM | TEMPERATURE: 97.5 F | SYSTOLIC BLOOD PRESSURE: 117 MMHG | HEART RATE: 118 BPM | BODY MASS INDEX: 26.52 KG/M2

## 2018-06-17 DIAGNOSIS — F41.9 ANXIETY: ICD-10-CM

## 2018-06-17 DIAGNOSIS — F10.929 ALCOHOLIC INTOXICATION WITH COMPLICATION (HCC): Primary | ICD-10-CM

## 2018-06-17 LAB
ALBUMIN SERPL-MCNC: 5 G/DL (ref 3.5–5)
ALBUMIN/GLOB SERPL: 1.7 G/DL (ref 1.1–2.5)
ALP SERPL-CCNC: 57 U/L (ref 24–120)
ALT SERPL W P-5'-P-CCNC: 33 U/L (ref 0–54)
AMPHET+METHAMPHET UR QL: NEGATIVE
ANION GAP SERPL CALCULATED.3IONS-SCNC: 16 MMOL/L (ref 4–13)
AST SERPL-CCNC: 24 U/L (ref 7–45)
BARBITURATES UR QL SCN: NEGATIVE
BASOPHILS # BLD AUTO: 0.06 10*3/MM3 (ref 0–0.2)
BASOPHILS NFR BLD AUTO: 0.9 % (ref 0–2)
BENZODIAZ UR QL SCN: NEGATIVE
BILIRUB SERPL-MCNC: 0.3 MG/DL (ref 0.1–1)
BUN BLD-MCNC: 10 MG/DL (ref 5–21)
BUN/CREAT SERPL: 12.7 (ref 7–25)
CALCIUM SPEC-SCNC: 9.9 MG/DL (ref 8.4–10.4)
CANNABINOIDS SERPL QL: NEGATIVE
CHLORIDE SERPL-SCNC: 105 MMOL/L (ref 98–110)
CO2 SERPL-SCNC: 25 MMOL/L (ref 24–31)
COCAINE UR QL: NEGATIVE
CREAT BLD-MCNC: 0.79 MG/DL (ref 0.5–1.4)
DEPRECATED RDW RBC AUTO: 39.8 FL (ref 40–54)
EOSINOPHIL # BLD AUTO: 0.02 10*3/MM3 (ref 0–0.7)
EOSINOPHIL NFR BLD AUTO: 0.3 % (ref 0–4)
ERYTHROCYTE [DISTWIDTH] IN BLOOD BY AUTOMATED COUNT: 12.9 % (ref 12–15)
ETHANOL UR QL: 0.09 %
GFR SERPL CREATININE-BSD FRML MDRD: 90 ML/MIN/1.73
GLOBULIN UR ELPH-MCNC: 3 GM/DL
GLUCOSE BLD-MCNC: 112 MG/DL (ref 70–100)
HCG SERPL QL: NEGATIVE
HCT VFR BLD AUTO: 39.6 % (ref 37–47)
HGB BLD-MCNC: 13.4 G/DL (ref 12–16)
HOLD SPECIMEN: NORMAL
HOLD SPECIMEN: NORMAL
IMM GRANULOCYTES # BLD: 0.02 10*3/MM3 (ref 0–0.03)
IMM GRANULOCYTES NFR BLD: 0.3 % (ref 0–5)
LIPASE SERPL-CCNC: 45 U/L (ref 23–203)
LYMPHOCYTES # BLD AUTO: 1.76 10*3/MM3 (ref 0.72–4.86)
LYMPHOCYTES NFR BLD AUTO: 25.9 % (ref 15–45)
MCH RBC QN AUTO: 28.8 PG (ref 28–32)
MCHC RBC AUTO-ENTMCNC: 33.8 G/DL (ref 33–36)
MCV RBC AUTO: 85 FL (ref 82–98)
METHADONE UR QL SCN: NEGATIVE
MONOCYTES # BLD AUTO: 0.43 10*3/MM3 (ref 0.19–1.3)
MONOCYTES NFR BLD AUTO: 6.3 % (ref 4–12)
NEUTROPHILS # BLD AUTO: 4.51 10*3/MM3 (ref 1.87–8.4)
NEUTROPHILS NFR BLD AUTO: 66.3 % (ref 39–78)
NRBC BLD MANUAL-RTO: 0 /100 WBC (ref 0–0)
NT-PROBNP SERPL-MCNC: 13 PG/ML (ref 0–450)
OPIATES UR QL: NEGATIVE
PCP UR QL SCN: NEGATIVE
PLATELET # BLD AUTO: 309 10*3/MM3 (ref 130–400)
PMV BLD AUTO: 9.9 FL (ref 6–12)
POTASSIUM BLD-SCNC: 4 MMOL/L (ref 3.5–5.3)
PROT SERPL-MCNC: 8 G/DL (ref 6.3–8.7)
RBC # BLD AUTO: 4.66 10*6/MM3 (ref 4.2–5.4)
SODIUM BLD-SCNC: 146 MMOL/L (ref 135–145)
WBC NRBC COR # BLD: 6.8 10*3/MM3 (ref 4.8–10.8)
WHOLE BLOOD HOLD SPECIMEN: NORMAL
WHOLE BLOOD HOLD SPECIMEN: NORMAL

## 2018-06-17 PROCEDURE — 84703 CHORIONIC GONADOTROPIN ASSAY: CPT | Performed by: EMERGENCY MEDICINE

## 2018-06-17 PROCEDURE — 80307 DRUG TEST PRSMV CHEM ANLYZR: CPT | Performed by: EMERGENCY MEDICINE

## 2018-06-17 PROCEDURE — 85025 COMPLETE CBC W/AUTO DIFF WBC: CPT | Performed by: EMERGENCY MEDICINE

## 2018-06-17 PROCEDURE — 80053 COMPREHEN METABOLIC PANEL: CPT | Performed by: EMERGENCY MEDICINE

## 2018-06-17 PROCEDURE — 83690 ASSAY OF LIPASE: CPT | Performed by: EMERGENCY MEDICINE

## 2018-06-17 PROCEDURE — 83880 ASSAY OF NATRIURETIC PEPTIDE: CPT | Performed by: EMERGENCY MEDICINE

## 2018-06-17 PROCEDURE — 96360 HYDRATION IV INFUSION INIT: CPT

## 2018-06-17 PROCEDURE — 99284 EMERGENCY DEPT VISIT MOD MDM: CPT

## 2018-06-17 RX ORDER — SODIUM CHLORIDE 9 MG/ML
75 INJECTION, SOLUTION INTRAVENOUS CONTINUOUS
Status: DISCONTINUED | OUTPATIENT
Start: 2018-06-17 | End: 2018-06-17 | Stop reason: HOSPADM

## 2018-06-17 RX ADMIN — SODIUM CHLORIDE 75 ML/HR: 9 INJECTION, SOLUTION INTRAVENOUS at 03:55

## 2018-06-17 NOTE — ED NOTES
Patient states that she feels much better now and wants to know about when she might get to go home. Explained to patient that urine results are pending and the doctor would decide after that     Zulema Beltran RN  06/17/18 3202

## 2018-06-17 NOTE — ED PROVIDER NOTES
"Subjective   Patient is a 22-year-old female with reported history of anxiety and depression for which she takes Wellbutrin every morning.  She reports that she has had a previous panic attack one month ago.  She reports that Saturday (2018) night (beginning at about 10:30 PM) she was at a local bar drinking a large amount of vodka and smoking  \"weed\".  She reports that she began feeling a panic attack after she had done that.  She reports that she takes a panic attack as to her using the large amount of vodka and smoking \"weed\" but also because her  is leaving for 2 months for an internship.  At this time the patient reports that she is feeling better and she would like to drink some water.  Patient denies SI/HI. Patient came by EMS.        History provided by:  Patient      Review of Systems   Constitutional: Negative.    HENT: Negative.    Eyes: Negative.    Respiratory: Negative.    Cardiovascular: Negative.    Gastrointestinal: Negative.    Endocrine: Negative.    Genitourinary: Negative.    Musculoskeletal: Negative.    Skin: Negative.    Allergic/Immunologic: Negative.    Neurological: Negative.    Hematological: Negative.    Psychiatric/Behavioral: The patient is nervous/anxious.    All other systems reviewed and are negative.      Past Medical History:   Diagnosis Date   • Anxiety        No Known Allergies    Past Surgical History:   Procedure Laterality Date   •  SECTION     • CYST REMOVAL     • HYMENECTOMY     • TONSILLECTOMY     • WISDOM TOOTH EXTRACTION         Family History   Problem Relation Age of Onset   • Diabetes Paternal Grandmother        Social History     Social History   • Marital status:      Spouse name: Nicholas   • Number of children: 2   • Years of education: 13     Occupational History   • homemaker      Social History Main Topics   • Smoking status: Former Smoker     Quit date: 2013   • Smokeless tobacco: Never Used   • Alcohol use No   • Drug use: Yes "     Types: Marijuana      Comment: TONIGHT   • Sexual activity: Yes     Partners: Male     Birth control/ protection: Implant     Other Topics Concern   • Not on file           Objective   Physical Exam   Constitutional: She is oriented to person, place, and time. She appears well-developed and well-nourished.   Mildly anxious appearing female   HENT:   Head: Normocephalic and atraumatic.   Right Ear: External ear normal.   Left Ear: External ear normal.   Nose: Nose normal.   Mouth/Throat: Oropharynx is clear and moist.   Eyes: Conjunctivae and EOM are normal. Pupils are equal, round, and reactive to light. Right eye exhibits no discharge. Left eye exhibits no discharge.   Neck: Normal range of motion. Neck supple. No tracheal deviation present. No thyromegaly present.   Cardiovascular: Regular rhythm, normal heart sounds and intact distal pulses.    No murmur heard.  Mild tachycardia   Pulmonary/Chest: Effort normal and breath sounds normal. No respiratory distress. She exhibits no tenderness.   Abdominal: Soft. She exhibits no distension. There is no tenderness.   Musculoskeletal: Normal range of motion. She exhibits no edema, tenderness or deformity.   Neurological: She is alert and oriented to person, place, and time. No cranial nerve deficit.   Skin: Skin is warm and dry. No erythema. No pallor.   Psychiatric: Judgment normal.   Mildly anxious appearing female   Nursing note and vitals reviewed.      Procedures           ED Course  ED Course as of Jun 17 0528   Sun Jun 17, 2018   0521 Patient reports she feels better at this time.  Patient reports she feels okay to be discharged home at this time.  Patient understands that she cannot drive until she is sober.  [RM]      ED Course User Index  [RM] Lonnie Romero MD                  MDM  Number of Diagnoses or Management Options  Alcoholic intoxication with complication:   Anxiety: established and worsening     Amount and/or Complexity of Data  Reviewed  Clinical lab tests: reviewed and ordered    Risk of Complications, Morbidity, and/or Mortality  Presenting problems: low  Diagnostic procedures: moderate  Management options: low    Patient Progress  Patient progress: stable        Final diagnoses:   Alcoholic intoxication with complication   Anxiety            Lonnie Romero MD  06/20/18 [id number]

## 2018-07-02 DIAGNOSIS — F32.A DEPRESSION, UNSPECIFIED DEPRESSION TYPE: ICD-10-CM

## 2018-07-02 DIAGNOSIS — G47.00 INSOMNIA, UNSPECIFIED TYPE: ICD-10-CM

## 2018-07-02 DIAGNOSIS — F41.9 ANXIETY: ICD-10-CM

## 2018-07-02 RX ORDER — CLONAZEPAM 0.5 MG/1
0.5 TABLET ORAL 2 TIMES DAILY PRN
Qty: 30 TABLET | Refills: 0 | Status: SHIPPED | OUTPATIENT
Start: 2018-07-02 | End: 2018-07-03

## 2018-07-03 ENCOUNTER — TELEPHONE (OUTPATIENT)
Dept: FAMILY MEDICINE CLINIC | Facility: CLINIC | Age: 23
End: 2018-07-03

## 2018-07-03 RX ORDER — MIRTAZAPINE 15 MG/1
15 TABLET, FILM COATED ORAL NIGHTLY
Qty: 30 TABLET | Refills: 1 | Status: SHIPPED | OUTPATIENT
Start: 2018-07-03 | End: 2018-12-18

## 2018-07-03 NOTE — TELEPHONE ENCOUNTER
Pt called the Klonopin seems to not to work as well for her and she would like to have the Remeron 15 mg back instead of the Klonopin [facility]  pt's last refill on [facility] was 5/29/18

## 2018-07-03 NOTE — TELEPHONE ENCOUNTER
Pt notified and Rx sent to [facility] and pt was advised to stop Klonopin and start Remeron and do not take together

## 2018-07-11 DIAGNOSIS — F41.9 ANXIETY: ICD-10-CM

## 2018-07-12 RX ORDER — ALPRAZOLAM 0.5 MG/1
TABLET ORAL
Qty: 20 TABLET | Refills: 0 | Status: SHIPPED | OUTPATIENT
Start: 2018-07-12 | End: 2018-08-13 | Stop reason: SDUPTHER

## 2018-07-13 DIAGNOSIS — F90.9 ATTENTION DEFICIT HYPERACTIVITY DISORDER (ADHD), UNSPECIFIED ADHD TYPE: ICD-10-CM

## 2018-07-13 RX ORDER — DEXTROAMPHETAMINE SACCHARATE, AMPHETAMINE ASPARTATE, DEXTROAMPHETAMINE SULFATE AND AMPHETAMINE SULFATE 3.75; 3.75; 3.75; 3.75 MG/1; MG/1; MG/1; MG/1
15 TABLET ORAL DAILY
Qty: 30 TABLET | Refills: 0 | Status: SHIPPED | OUTPATIENT
Start: 2018-07-13 | End: 2018-08-13 | Stop reason: SDUPTHER

## 2018-08-13 DIAGNOSIS — F41.9 ANXIETY: ICD-10-CM

## 2018-08-13 DIAGNOSIS — F90.9 ATTENTION DEFICIT HYPERACTIVITY DISORDER (ADHD), UNSPECIFIED ADHD TYPE: ICD-10-CM

## 2018-08-13 RX ORDER — ALPRAZOLAM 0.5 MG/1
TABLET ORAL
Qty: 20 TABLET | Refills: 0 | Status: SHIPPED | OUTPATIENT
Start: 2018-08-13 | End: 2018-09-13 | Stop reason: SDUPTHER

## 2018-08-13 RX ORDER — DEXTROAMPHETAMINE SACCHARATE, AMPHETAMINE ASPARTATE, DEXTROAMPHETAMINE SULFATE AND AMPHETAMINE SULFATE 3.75; 3.75; 3.75; 3.75 MG/1; MG/1; MG/1; MG/1
15 TABLET ORAL DAILY
Qty: 30 TABLET | Refills: 0 | Status: SHIPPED | OUTPATIENT
Start: 2018-08-13 | End: 2018-09-13 | Stop reason: SDUPTHER

## 2018-08-13 NOTE — TELEPHONE ENCOUNTER
Xanax refill [facility] last Rx was 7/12/18 NEEL ellison    Adderall refill [facility] done per protocol last refill was 7/13/18 NEEL ellison

## 2018-09-13 DIAGNOSIS — F41.9 ANXIETY: ICD-10-CM

## 2018-09-13 DIAGNOSIS — F32.A DEPRESSION, UNSPECIFIED DEPRESSION TYPE: ICD-10-CM

## 2018-09-13 DIAGNOSIS — F90.9 ATTENTION DEFICIT HYPERACTIVITY DISORDER (ADHD), UNSPECIFIED ADHD TYPE: ICD-10-CM

## 2018-09-13 DIAGNOSIS — G47.00 INSOMNIA, UNSPECIFIED TYPE: ICD-10-CM

## 2018-09-13 RX ORDER — ALPRAZOLAM 0.5 MG/1
TABLET ORAL
Qty: 20 TABLET | Refills: 0 | Status: SHIPPED | OUTPATIENT
Start: 2018-09-13 | End: 2018-10-25 | Stop reason: SDUPTHER

## 2018-09-13 RX ORDER — DEXTROAMPHETAMINE SACCHARATE, AMPHETAMINE ASPARTATE, DEXTROAMPHETAMINE SULFATE AND AMPHETAMINE SULFATE 3.75; 3.75; 3.75; 3.75 MG/1; MG/1; MG/1; MG/1
15 TABLET ORAL DAILY
Qty: 30 TABLET | Refills: 0 | Status: SHIPPED | OUTPATIENT
Start: 2018-09-13 | End: 2018-10-17 | Stop reason: SDUPTHER

## 2018-09-13 RX ORDER — BUPROPION HYDROCHLORIDE 150 MG/1
TABLET ORAL
Qty: 30 TABLET | Refills: 5 | Status: SHIPPED | OUTPATIENT
Start: 2018-09-13 | End: 2018-11-26 | Stop reason: SDUPTHER

## 2018-09-13 NOTE — TELEPHONE ENCOUNTER
Xanax and Adderall refill [facility] done per protocol last refill was 8/13/18 NEEL ellison

## 2018-10-17 DIAGNOSIS — F90.9 ATTENTION DEFICIT HYPERACTIVITY DISORDER (ADHD), UNSPECIFIED ADHD TYPE: ICD-10-CM

## 2018-10-17 RX ORDER — DEXTROAMPHETAMINE SACCHARATE, AMPHETAMINE ASPARTATE, DEXTROAMPHETAMINE SULFATE AND AMPHETAMINE SULFATE 3.75; 3.75; 3.75; 3.75 MG/1; MG/1; MG/1; MG/1
15 TABLET ORAL DAILY
Qty: 30 TABLET | Refills: 0 | Status: SHIPPED | OUTPATIENT
Start: 2018-10-17 | End: 2018-11-26 | Stop reason: SDUPTHER

## 2018-10-25 DIAGNOSIS — F41.9 ANXIETY: ICD-10-CM

## 2018-10-25 RX ORDER — ALPRAZOLAM 0.5 MG/1
0.5 TABLET ORAL 2 TIMES DAILY PRN
Qty: 20 TABLET | Refills: 0 | Status: SHIPPED | OUTPATIENT
Start: 2018-10-25 | End: 2018-11-26 | Stop reason: SDUPTHER

## 2018-11-26 DIAGNOSIS — F90.9 ATTENTION DEFICIT HYPERACTIVITY DISORDER (ADHD), UNSPECIFIED ADHD TYPE: ICD-10-CM

## 2018-11-26 DIAGNOSIS — F32.A DEPRESSION, UNSPECIFIED DEPRESSION TYPE: ICD-10-CM

## 2018-11-26 DIAGNOSIS — G47.00 INSOMNIA, UNSPECIFIED TYPE: ICD-10-CM

## 2018-11-26 DIAGNOSIS — F41.9 ANXIETY: ICD-10-CM

## 2018-11-26 RX ORDER — ALPRAZOLAM 0.5 MG/1
0.5 TABLET ORAL 2 TIMES DAILY PRN
Qty: 20 TABLET | Refills: 0 | Status: SHIPPED | OUTPATIENT
Start: 2018-11-26 | End: 2019-01-04 | Stop reason: SDUPTHER

## 2018-11-26 RX ORDER — BUPROPION HYDROCHLORIDE 150 MG/1
150 TABLET ORAL DAILY
Qty: 30 TABLET | Refills: 0 | Status: SHIPPED | OUTPATIENT
Start: 2018-11-26 | End: 2019-01-04 | Stop reason: SDUPTHER

## 2018-11-26 RX ORDER — DEXTROAMPHETAMINE SACCHARATE, AMPHETAMINE ASPARTATE, DEXTROAMPHETAMINE SULFATE AND AMPHETAMINE SULFATE 3.75; 3.75; 3.75; 3.75 MG/1; MG/1; MG/1; MG/1
15 TABLET ORAL DAILY
Qty: 30 TABLET | Refills: 0 | Status: SHIPPED | OUTPATIENT
Start: 2018-11-26 | End: 2018-12-18 | Stop reason: DRUGHIGH

## 2018-11-26 NOTE — TELEPHONE ENCOUNTER
"Vm \"refill my adderall, Wellbutrin, xanax\"    Pt was last seen 5-29-18, has been in ER 6-17-18    Last filled adderall 10-17-18    Last xanax 10-25-18  "

## 2018-11-26 NOTE — TELEPHONE ENCOUNTER
Notified pt and appt made 12-18-18, advised must keep appt to continue to get refills, pt stated understanding

## 2018-12-18 ENCOUNTER — OFFICE VISIT (OUTPATIENT)
Dept: FAMILY MEDICINE CLINIC | Facility: CLINIC | Age: 23
End: 2018-12-18

## 2018-12-18 VITALS
DIASTOLIC BLOOD PRESSURE: 86 MMHG | SYSTOLIC BLOOD PRESSURE: 104 MMHG | BODY MASS INDEX: 26.03 KG/M2 | WEIGHT: 162 LBS | HEART RATE: 136 BPM | TEMPERATURE: 98.5 F | OXYGEN SATURATION: 99 % | HEIGHT: 66 IN | RESPIRATION RATE: 18 BRPM

## 2018-12-18 DIAGNOSIS — G47.00 INSOMNIA, UNSPECIFIED TYPE: ICD-10-CM

## 2018-12-18 DIAGNOSIS — F41.9 ANXIETY: ICD-10-CM

## 2018-12-18 DIAGNOSIS — J02.9 PHARYNGITIS, UNSPECIFIED ETIOLOGY: ICD-10-CM

## 2018-12-18 DIAGNOSIS — I49.9 CARDIAC ARRHYTHMIA, UNSPECIFIED CARDIAC ARRHYTHMIA TYPE: ICD-10-CM

## 2018-12-18 DIAGNOSIS — F90.9 ATTENTION DEFICIT HYPERACTIVITY DISORDER (ADHD), UNSPECIFIED ADHD TYPE: Primary | ICD-10-CM

## 2018-12-18 DIAGNOSIS — J32.9 SINUSITIS, UNSPECIFIED CHRONICITY, UNSPECIFIED LOCATION: ICD-10-CM

## 2018-12-18 DIAGNOSIS — R05.9 COUGH: ICD-10-CM

## 2018-12-18 DIAGNOSIS — F32.A DEPRESSION, UNSPECIFIED DEPRESSION TYPE: ICD-10-CM

## 2018-12-18 PROBLEM — Z87.442 HISTORY OF KIDNEY STONES: Status: ACTIVE | Noted: 2017-02-21

## 2018-12-18 PROCEDURE — 99214 OFFICE O/P EST MOD 30 MIN: CPT | Performed by: FAMILY MEDICINE

## 2018-12-18 RX ORDER — DEXTROAMPHETAMINE SACCHARATE, AMPHETAMINE ASPARTATE MONOHYDRATE, DEXTROAMPHETAMINE SULFATE AND AMPHETAMINE SULFATE 2.5; 2.5; 2.5; 2.5 MG/1; MG/1; MG/1; MG/1
10 CAPSULE, EXTENDED RELEASE ORAL EVERY MORNING
Qty: 30 CAPSULE | Refills: 0 | Status: SHIPPED | OUTPATIENT
Start: 2018-12-18 | End: 2019-02-05 | Stop reason: SDUPTHER

## 2018-12-18 NOTE — PATIENT INSTRUCTIONS
Consider with your family and over the holidays and let us know about a referral to a counselor.

## 2018-12-18 NOTE — PROGRESS NOTES
Subjective   Suzi Capone is a 23 y.o. female presenting with chief complaint of:   Chief Complaint   Patient presents with   • Anxiety     Medication follow-up. Problem with sleeping at night.       History of Present Illness :  Alone.   Has multiple chronic problems to consider that might have a bearing on today's issues;  an interval appointment.       Chronic/acute problems reviewed today:   1. Attention deficit hyperactivity disorder (ADHD), unspecified ADHD type: chronic/variable and Rx has helped.  Rx in sitting however of tachycardia, anxiety/panic, insomnia.    2. Cardiac arrhythmia, unspecified cardiac arrhythmia type: chronic/occ palpitation without syncope/near.    3. Sinusitis, unspecified chronicity, unspecified location: acute/3-4 without fever.    4. Cough: acute/3-4 days dry without wheeze, SOB   5. Pharyngitis, unspecified etiology: acute/3-4 days without dysphagia.    6. Depression, unspecified depression type: No significant mood swings, down moods; same nervousness, difficulty with concentration to function home/work  Once  completes [employer] he has a job in [place]; they will be moving (she is worried about that a lot).   Others close have not been concerned.  No suicide ideation/intent.  Rx helps.       7. Anxiety: Chronic/variable:  On/off anxiety tolerated somewhat but still periods/feelings panic.  Rx helps and not interested in Rx change but interested in counselor. Stress ongoing.      8. Insomnia, unspecified type: chronic/stable difficulty falling, staying with all the above worry; denies orthopnea, apnea, pain.      Has an/another acute issue today: none.    The following portions of the patient's history were reviewed and updated as appropriate: allergies, current medications, past family history, past medical history, past social history, past surgical history and problem list.      Current Outpatient Medications:   •  ALPRAZolam (XANAX) 0.5 MG tablet, Take 1 tablet by  mouth 2 (Two) Times a Day As Needed for Anxiety., Disp: 20 tablet, Rfl: 0  •  buPROPion XL (WELLBUTRIN XL) 150 MG 24 hr tablet, Take 1 tablet by mouth Daily., Disp: 30 tablet, Rfl: 0  •  levonorgestrel (MIRENA, 52 MG,) 20 MCG/24HR IUD, 1 each by Intrauterine route 1 (One) Time., Disp: , Rfl:   •  amphetamine-dextroamphetamine XR (ADDERALL XR) 10 MG 24 hr capsule, Take 1 capsule by mouth Every Morning, Disp: 30 capsule, Rfl: 0    No problems with medications.  Refills if needed done    No Known Allergies    Review of Systems  GENERAL:  Active home/with twins. Sleep is often hard falling/staying.   Apnea denied. No fever now.  SKIN: No rash/skin lesion of concern:   ENDO:  No syncope, near or diaphoretic sweaty spells.  HEENT: No recent head injury; or headache.   No vision change.  No hearing loss.  Ears without pain/drainage.  No sore throat.  No usual/significant nasal/sinus congestion/drainage. No epistaxis.  CHEST: No chest wall tenderness or mass. No usual cough, without wheeze.  No SOB; no hemoptysis.  CV: No pattern chest pain, palpitations, ankle edema.  GI: No heartburn, dysphagia.  No abdominal pain, diarrhea, constipation.  No rectal bleeding, or melena.    :  Voids without dysuria, or  incontinence to completion.  ORTHO: No painful/swollen joints but various on /off sore.  No sore neck or back.  No acute neck or back pain without recent injury.  NEURO: No dizziness, weakness of extremities.  No numbness/paresthesias.   PSYCH: No memory loss.  Mood variable; often anxious, mildly depressed but/and not suicidal.  Tries to tolerate stress .   Screening:  Mammogram: NA  Bone density: NA  Low dose CT chest: NA  GI: NA  Prostate: NA  Usual lab order  12m CBC, CMP, LIPID, TSH, fT4    Results for orders placed or performed during the hospital encounter of 06/17/18   Comprehensive Metabolic Panel   Result Value Ref Range    Glucose 112 (H) 70 - 100 mg/dL    BUN 10 5 - 21 mg/dL    Creatinine 0.79 0.50 - 1.40  mg/dL    Sodium 146 (H) 135 - 145 mmol/L    Potassium 4.0 3.5 - 5.3 mmol/L    Chloride 105 98 - 110 mmol/L    CO2 25.0 24.0 - 31.0 mmol/L    Calcium 9.9 8.4 - 10.4 mg/dL    Total Protein 8.0 6.3 - 8.7 g/dL    Albumin 5.00 3.50 - 5.00 g/dL    ALT (SGPT) 33 0 - 54 U/L    AST (SGOT) 24 7 - 45 U/L    Alkaline Phosphatase 57 24 - 120 U/L    Total Bilirubin 0.3 0.1 - 1.0 mg/dL    eGFR Non African Amer 90 >60 mL/min/1.73    Globulin 3.0 gm/dL    A/G Ratio 1.7 1.1 - 2.5 g/dL    BUN/Creatinine Ratio 12.7 7.0 - 25.0    Anion Gap 16.0 (H) 4.0 - 13.0 mmol/L   hCG, Serum, Qualitative   Result Value Ref Range    HCG Qualitative Negative Negative   Lipase   Result Value Ref Range    Lipase 45 23 - 203 U/L   Ethanol   Result Value Ref Range    Ethanol % 0.090 (H) <0.010 %   Urine Drug Screen - Urine, Clean Catch   Result Value Ref Range    Amphetamine Screen, Urine Negative Negative    Barbiturates Screen, Urine Negative Negative    Benzodiazepine Screen, Urine Negative Negative    Cocaine Screen, Urine Negative Negative    Methadone Screen, Urine Negative Negative    Opiate Screen Negative Negative    Phencyclidine (PCP), Urine Negative Negative    THC, Screen, Urine Negative Negative   CBC Auto Differential   Result Value Ref Range    WBC 6.80 4.80 - 10.80 10*3/mm3    RBC 4.66 4.20 - 5.40 10*6/mm3    Hemoglobin 13.4 12.0 - 16.0 g/dL    Hematocrit 39.6 37.0 - 47.0 %    MCV 85.0 82.0 - 98.0 fL    MCH 28.8 28.0 - 32.0 pg    MCHC 33.8 33.0 - 36.0 g/dL    RDW 12.9 12.0 - 15.0 %    RDW-SD 39.8 (L) 40.0 - 54.0 fl    MPV 9.9 6.0 - 12.0 fL    Platelets 309 130 - 400 10*3/mm3    Neutrophil % 66.3 39.0 - 78.0 %    Lymphocyte % 25.9 15.0 - 45.0 %    Monocyte % 6.3 4.0 - 12.0 %    Eosinophil % 0.3 0.0 - 4.0 %    Basophil % 0.9 0.0 - 2.0 %    Immature Grans % 0.3 0.0 - 5.0 %    Neutrophils, Absolute 4.51 1.87 - 8.40 10*3/mm3    Lymphocytes, Absolute 1.76 0.72 - 4.86 10*3/mm3    Monocytes, Absolute 0.43 0.19 - 1.30 10*3/mm3    Eosinophils,  Absolute 0.02 0.00 - 0.70 10*3/mm3    Basophils, Absolute 0.06 0.00 - 0.20 10*3/mm3    Immature Grans, Absolute 0.02 0.00 - 0.03 10*3/mm3    nRBC 0.0 0.0 - 0.0 /100 WBC   BNP   Result Value Ref Range    proBNP 13.0 0.0 - 450.0 pg/mL   Light Blue Top   Result Value Ref Range    Extra Tube hold for add-on    Green Top (Gel)   Result Value Ref Range    Extra Tube Hold for add-ons.    Lavender Top   Result Value Ref Range    Extra Tube hold for add-on    Red Top   Result Value Ref Range    Extra Tube Hold for add-ons.        No results found for: PSA     Lab Results:  CBC:  Lab Results - Last 18 Months   Lab Units  06/17/18   0353  05/09/18   1400   WBC 10*3/mm3  6.80  6.08   HEMOGLOBIN g/dL  13.4  13.0   HEMATOCRIT %  39.6  37.7   PLATELETS 10*3/mm3  309  303      BMP/CMP:  Lab Results - Last 18 Months   Lab Units  06/17/18   0353  05/09/18   1510  05/09/18   1400  12/05/17   1429   SODIUM mmol/L  146*   --   142  139   SODIUM, ARTERIAL mmol/L   --   140   --    --    POTASSIUM mmol/L  4.0   --   3.8  4.5   CHLORIDE mmol/L  105   --   107  105   CO2 mmol/L  25.0   --   20.0*   --    TOTAL CO2 mmol/L   --    --    --   23.0*   GLUCOSE mg/dL   --    --    --   80   BUN mg/dL  10   --   13  10   CREATININE mg/dL  0.79   --   0.60  0.72   EGFR IF NONAFRICN AM mL/min/1.73  90   --   124  100   EGFR IF AFRICN AM mL/min/1.73   --    --    --   122   CALCIUM mg/dL  9.9   --   9.8  9.5     HEPATIC:  Lab Results - Last 18 Months   Lab Units  06/17/18   0353  05/09/18   1400  12/05/17   1429   ALT (SGPT) U/L  33  31  35   AST (SGOT) U/L  24  22  19   ALK PHOS U/L  57  59  56     THYROID:  Lab Results - Last 18 Months   Lab Units  05/09/18   1400   TSH mIU/mL  1.360     A1C:  Lab Results - Last 18 Months   Lab Units  12/05/17   1429   HEMOGLOBIN A1C %  5.10     PSA:No results for input(s): PSA in the last 66018 hours.    Objective   /86 (BP Location: Left arm, Patient Position: Sitting, Cuff Size: Adult)   Pulse (!) 136    "Temp 98.5 °F (36.9 °C) (Oral)   Resp 18   Ht 167.6 cm (66\")   Wt 73.5 kg (162 lb)   SpO2 99%   BMI 26.15 kg/m²   Body mass index is 26.15 kg/m².    Physical Exam  GENERAL:  Well nourished/developed in no acute distress initially; then became anxious/upset and had to lay down and talking with her calmed down.   SKIN: Turgor excellent.  HEENT: Normal cephalic without trauma.  Pupils equal round reactive to light. Extraocular motions full without nystagmus.   No thyromegaly, mass, tenderness, lymphadenopathy and supple.  CV: Regular rhythm.  No murmur, gallop,  edema.   CHEST: No chest wall tenderness or mass.   LUNGS: Symmetric motion with clear to auscultation.   ABD: Soft, nontender without mass.   ORTHO: Symmetric extremities without swelling/point tenderness.  Full gross range of motion.  NEURO: CN 2-12 grossly intact.  Symmetric facies and UE/LE. 3/5 strength throughout. 1/4 x bicep equal reflexes.  Nonfocal use extremities. Speech clear.    PSYCH:  Oriented x 3.  Eventually pleasant calm, well kept with purposeful/directed conservation with intact short/long gross memory.     Assessment/Plan     1. Attention deficit hyperactivity disorder (ADHD), unspecified ADHD type    2. Cardiac arrhythmia, unspecified cardiac arrhythmia type    3. Sinusitis, unspecified chronicity, unspecified location    4. Cough    5. Pharyngitis, unspecified etiology    6. Depression, unspecified depression type    7. Anxiety    8. Insomnia, unspecified type      URI looks viral  Anxiety; ? Influence of adderall with others tachycardia, insomnia  ADHD looks controlled; and tolerant of decrease in Rx    Rx: reviewed/changes:  New Medications Ordered This Visit   Medications   • amphetamine-dextroamphetamine XR (ADDERALL XR) 10 MG 24 hr capsule     Sig: Take 1 capsule by mouth Every Morning     Dispense:  30 capsule     Refill:  0     LAB/Testing/Referrals: reviewed/orders:   Today:   No orders of the defined types were placed in this " encounter.      Discussions:   above  Body mass index is 26.15 kg/m².   Patient's Body mass index is 26.15 kg/m². BMI is within normal parameters. No follow-up required.  Non-smoker      Patient Instructions   Consider with your family and over the holidays and let us know about a referral to a counselor.        Follow up: Return for Dr Acevedo-2m.  Future Appointments   Date Time Provider Department Center   2/18/2019  2:15 PM Jaylon Acevedo MD [facility] None

## 2018-12-28 ENCOUNTER — OFFICE VISIT (OUTPATIENT)
Dept: URGENT CARE | Age: 23
End: 2018-12-28
Payer: MEDICAID

## 2018-12-28 ENCOUNTER — NURSE TRIAGE (OUTPATIENT)
Dept: CALL CENTER | Facility: HOSPITAL | Age: 23
End: 2018-12-28

## 2018-12-28 VITALS
RESPIRATION RATE: 20 BRPM | OXYGEN SATURATION: 98 % | WEIGHT: 163 LBS | TEMPERATURE: 98.4 F | BODY MASS INDEX: 26.31 KG/M2 | HEART RATE: 109 BPM | DIASTOLIC BLOOD PRESSURE: 80 MMHG | SYSTOLIC BLOOD PRESSURE: 118 MMHG

## 2018-12-28 DIAGNOSIS — R50.9 FEVER, UNSPECIFIED FEVER CAUSE: ICD-10-CM

## 2018-12-28 DIAGNOSIS — R52 BODY ACHES: ICD-10-CM

## 2018-12-28 DIAGNOSIS — K52.9 GASTROENTERITIS: Primary | ICD-10-CM

## 2018-12-28 LAB
INFLUENZA A ANTIBODY: NEGATIVE
INFLUENZA B ANTIBODY: NEGATIVE
S PYO AG THROAT QL: NORMAL

## 2018-12-28 PROCEDURE — 87804 INFLUENZA ASSAY W/OPTIC: CPT | Performed by: SPECIALIST

## 2018-12-28 PROCEDURE — 87880 STREP A ASSAY W/OPTIC: CPT | Performed by: SPECIALIST

## 2018-12-28 PROCEDURE — 99213 OFFICE O/P EST LOW 20 MIN: CPT | Performed by: SPECIALIST

## 2018-12-28 RX ORDER — ONDANSETRON HYDROCHLORIDE 8 MG/1
8 TABLET, FILM COATED ORAL EVERY 8 HOURS PRN
Qty: 15 TABLET | Refills: 0 | Status: SHIPPED | OUTPATIENT
Start: 2018-12-28 | End: 2018-12-31

## 2018-12-28 RX ORDER — ALPRAZOLAM 0.5 MG/1
0.5 TABLET ORAL
COMMUNITY
Start: 2018-11-26

## 2018-12-28 RX ORDER — BUPROPION HYDROCHLORIDE 150 MG/1
150 TABLET ORAL
COMMUNITY
Start: 2018-11-26

## 2018-12-28 ASSESSMENT — ENCOUNTER SYMPTOMS
NAUSEA: 1
VOMITING: 0
DIARRHEA: 0

## 2018-12-28 NOTE — TELEPHONE ENCOUNTER
Aching all over, muscle aches, splitting headache, anxiety, nausea not sleeping well, hurts all over.     Reason for Disposition  • [1] Patient is NOT HIGH RISK AND [2] strongly requests antiviral medicine AND [3] flu symptoms present < 48 hours    Additional Information  • Negative: Severe difficulty breathing (e.g., struggling for each breath, speaks in single words)  • Negative: Bluish (or gray) lips or face now  • Negative: Shock suspected (e.g., cold/pale/clammy skin, too weak to stand, low BP, rapid pulse)  • Negative: Sounds like a life-threatening emergency to the triager  • Negative: [1] Sounds like a cold AND [2] no fever  • Negative: [1] Cough with sputum AND [2] no fever  • Negative: [1] Dry cough (no sputum) sputum AND [2] no fever  • Negative: [1] Throat pain AND [2] no fever  • Negative: Influenza vaccine reaction is suspected  • Negative: Chest pain  (Exception: MILD central chest pain, present only when coughing)  • Negative: [1] Headache AND [2] stiff neck (can't touch chin to chest)  • Negative: Fever > 104 F (40 C)  • Negative: [1] Difficulty breathing AND [2] not severe AND [3] not from stuffy nose (e.g., not relieved by cleaning out the nose)  • Negative: Patient sounds very sick or weak to the triager  • Negative: [1] Fever > 101 F (38.3 C) AND [2] age > 60  • Negative: [1] Fever > 100.0 F (37.8 C) AND [2] bedridden (e.g., nursing home patient, CVA, chronic illness, recovering from surgery)  • Negative: [1] Fever > 100.0 F (37.8 C) AND [2] diabetes mellitus or weak immune system (e.g., HIV positive, cancer chemo, splenectomy, chronic steroids)  • Negative: Patient is HIGH RISK (e.g., age > 64 years, pregnant, HIV+, or chronic medical condition)  • Negative: Fever present > 3 days (72 hours)  • Negative: [1] Fever returns after gone for over 24 hours AND [2] symptoms worse or not improved  • Negative: [1] Using nasal washes and pain medicine > 24 hours AND [2] sinus pain (around cheekbone or  "eye) persists  • Negative: Earache    Answer Assessment - Initial Assessment Questions  1. WORST SYMPTOM: \"What is your worst symptom?\" (e.g., cough, runny nose, muscle aches, headache, sore throat, fever)       Headache, nausea and severe body aches  2. ONSET: \"When did your flu symptoms start?\"       Yesterday last night  3. COUGH: \"How bad is the cough?\"        no  4. RESPIRATORY DISTRESS: \"Describe your breathing.\"       Breathing ok  5. FEVER: \"Do you have a fever?\" If so, ask: \"What is your temperature, how was it measured, and when did it start?\"      Not taken, does not feel like it  6. EXPOSURE: \"Were you exposed to someone with influenza?\"        yes  7. FLU VACCINE: \"Did you get a flu shot this year?\"      yes  8. HIGH RISK DISEASE: \"Do you any chronic medical problems?\" (e.g., heart or lung disease, asthma, weak immune system, or other HIGH RISK conditions)      no  9. PREGNANCY: \"Is there any chance you are pregnant?\" \"When was your last menstrual period?\"      no  10. OTHER SYMPTOMS: \"Do you have any other symptoms?\"  (e.g., runny nose, muscle aches, headache, sore throat)        Muscle aching and soreness, splitting headache,nausea.    Protocols used: INFLUENZA - SEASONAL-ADULT-      "

## 2018-12-28 NOTE — PROGRESS NOTES
stools.    Call your doctor now or seek immediate medical care if:    · You have severe belly pain.     · You have signs of needing more fluids. You have sunken eyes, a dry mouth, and pass only a little dark urine.     · You feel like you are going to faint.     · You have increased belly pain that does not go away in 1 to 2 days.     · You have new or increased nausea, or you are vomiting.     · You have a new or higher fever.     · Your stools are black and tarlike or have streaks of blood.    Watch closely for changes in your health, and be sure to contact your doctor if:    · You are dizzy or lightheaded.     · You urinate less than usual, or your urine is dark yellow or brown.     · You do not feel better with each day that goes by. Where can you learn more? Go to https://[employer]. org and sign in to your [employer] account. Enter N142 in the [employer] box to learn more about \"Gastroenteritis: Care Instructions. \"     If you do not have an account, please click on the \"Sign Up Now\" link. Current as of: November 18, 2017  Content Version: 11.8  © 9166-1720 Healthwise, [employer]. Care instructions adapted under license by [facility] ([facility]). If you have questions about a medical condition or this instruction, always ask your healthcare professional. [address]  any warranty or liability for your use of this information.                Electronically signed by EVE Gimenez NP on 12/28/2018 at 1:59 PM

## 2018-12-31 ENCOUNTER — TELEPHONE (OUTPATIENT)
Dept: FAMILY MEDICINE CLINIC | Facility: CLINIC | Age: 23
End: 2018-12-31

## 2019-01-04 ENCOUNTER — PRIOR AUTHORIZATION (OUTPATIENT)
Dept: FAMILY MEDICINE CLINIC | Facility: CLINIC | Age: 24
End: 2019-01-04

## 2019-01-04 DIAGNOSIS — F41.9 ANXIETY: ICD-10-CM

## 2019-01-04 DIAGNOSIS — G47.00 INSOMNIA, UNSPECIFIED TYPE: ICD-10-CM

## 2019-01-04 DIAGNOSIS — F32.A DEPRESSION, UNSPECIFIED DEPRESSION TYPE: ICD-10-CM

## 2019-01-04 RX ORDER — ALPRAZOLAM 0.5 MG/1
0.5 TABLET ORAL 2 TIMES DAILY PRN
Qty: 20 TABLET | Refills: 0 | Status: SHIPPED | OUTPATIENT
Start: 2019-01-04 | End: 2019-02-05 | Stop reason: SDUPTHER

## 2019-01-04 RX ORDER — BUPROPION HYDROCHLORIDE 150 MG/1
150 TABLET ORAL DAILY
Qty: 90 TABLET | Refills: 1 | Status: SHIPPED | OUTPATIENT
Start: 2019-01-04 | End: 2019-02-18 | Stop reason: SDUPTHER

## 2019-01-04 NOTE — TELEPHONE ENCOUNTER
Spoke to pt and advised that it is going to cost over $100.00 and the pharmacy said if Dr Acevedo did a PA that maybe it would go through? Advised pt that we didn't get a denial from the pharmacy, as normally the first day an Rx is denied that the pharmacy will send office that information so we can work on PA. Pt stated understanding and will check with the pharmacy

## 2019-01-04 NOTE — TELEPHONE ENCOUNTER
PA done by phone for Adderal Xr 10mg and was approved indefinitely as long as the pt has this plan, call placed to pt pharmacy and message left on pharmacy vm approved and called pt and advised     Pt requested refill of xanax 11-26-18    Protocol last refill 11-26-18    NEEL COYLE

## 2019-02-05 DIAGNOSIS — F90.9 ATTENTION DEFICIT HYPERACTIVITY DISORDER (ADHD), UNSPECIFIED ADHD TYPE: ICD-10-CM

## 2019-02-05 DIAGNOSIS — F41.9 ANXIETY: ICD-10-CM

## 2019-02-05 RX ORDER — ALPRAZOLAM 0.5 MG/1
0.5 TABLET ORAL 2 TIMES DAILY PRN
Qty: 20 TABLET | Refills: 0 | Status: SHIPPED | OUTPATIENT
Start: 2019-02-05 | End: 2019-03-05 | Stop reason: SDUPTHER

## 2019-02-05 RX ORDER — DEXTROAMPHETAMINE SACCHARATE, AMPHETAMINE ASPARTATE MONOHYDRATE, DEXTROAMPHETAMINE SULFATE AND AMPHETAMINE SULFATE 2.5; 2.5; 2.5; 2.5 MG/1; MG/1; MG/1; MG/1
10 CAPSULE, EXTENDED RELEASE ORAL EVERY MORNING
Qty: 30 CAPSULE | Refills: 0 | Status: SHIPPED | OUTPATIENT
Start: 2019-02-05 | End: 2019-03-05 | Stop reason: SDUPTHER

## 2019-02-05 NOTE — TELEPHONE ENCOUNTER
"Vm \"refill my adderall, welbutrin, xanax\"    Protocol last refill adderall 12-18-18  Xanax 1-4-19    [facility]EL Hammer wnl's  "

## 2019-02-18 ENCOUNTER — OFFICE VISIT (OUTPATIENT)
Dept: FAMILY MEDICINE CLINIC | Facility: CLINIC | Age: 24
End: 2019-02-18

## 2019-02-18 VITALS
WEIGHT: 162 LBS | DIASTOLIC BLOOD PRESSURE: 80 MMHG | HEIGHT: 66 IN | RESPIRATION RATE: 18 BRPM | SYSTOLIC BLOOD PRESSURE: 115 MMHG | HEART RATE: 108 BPM | BODY MASS INDEX: 26.03 KG/M2 | TEMPERATURE: 98.6 F | OXYGEN SATURATION: 98 %

## 2019-02-18 DIAGNOSIS — G47.00 INSOMNIA, UNSPECIFIED TYPE: ICD-10-CM

## 2019-02-18 DIAGNOSIS — F90.9 ATTENTION DEFICIT HYPERACTIVITY DISORDER (ADHD), UNSPECIFIED ADHD TYPE: ICD-10-CM

## 2019-02-18 DIAGNOSIS — F41.9 ANXIETY: ICD-10-CM

## 2019-02-18 DIAGNOSIS — F32.A DEPRESSION, UNSPECIFIED DEPRESSION TYPE: ICD-10-CM

## 2019-02-18 DIAGNOSIS — I49.9 CARDIAC ARRHYTHMIA, UNSPECIFIED CARDIAC ARRHYTHMIA TYPE: Primary | ICD-10-CM

## 2019-02-18 PROCEDURE — 99213 OFFICE O/P EST LOW 20 MIN: CPT | Performed by: FAMILY MEDICINE

## 2019-02-18 RX ORDER — BUPROPION HYDROCHLORIDE 150 MG/1
150 TABLET ORAL DAILY
Qty: 90 TABLET | Refills: 1 | Status: SHIPPED | OUTPATIENT
Start: 2019-02-18 | End: 2019-05-06 | Stop reason: SDUPTHER

## 2019-02-18 NOTE — PATIENT INSTRUCTIONS
Suggest you get appointment now with new Dr in [facility] area you plan to use.      Labs here before leaving    Leave with full bottles of your Rx (as unlikely I can write all your Rx long distance).

## 2019-02-18 NOTE — PROGRESS NOTES
Subjective   Suzi Capone is a 23 y.o. female presenting with chief complaint of:   Chief Complaint   Patient presents with   • ADHD     follow up       History of Present Illness :  With daughters.   Has multiple chronic problems to consider that might have a bearing on today's issues;  an interval appointment.   Last visit changed adderall to long acting; she is much impressed with doing better.  Moving to [place] May.     Chronic/acute problems reviewed today:   1. Cardiac arrhythmia, unspecified cardiac arrhythmia type: chronic/variable occ palpitations without documented arrhythmia.    2. Anxiety: chronic/variable; recently worse but change adderall to long acting helping.    3. Attention deficit hyperactivity disorder (ADHD), unspecified ADHD type: chronic/variable ability to concentrate but Rx helps.      4. Depression, unspecified depression type: chronic/variable occ significant mood swings, down moods, nervousness, difficulty with concentration to function home/work.  Others close have not been concerned.  No suicide ideation/intent.  Rx helping.       5. Insomnia, unspecified type: chronic/variable associated with above.  Denies apnea.      Has an/another acute issue today: none.    The following portions of the patient's history were reviewed and updated as appropriate: allergies, current medications, past family history, past medical history, past social history, past surgical history and problem list.      Current Outpatient Medications:   •  ALPRAZolam (XANAX) 0.5 MG tablet, Take 1 tablet by mouth 2 (Two) Times a Day As Needed for Anxiety., Disp: 20 tablet, Rfl: 0  •  amphetamine-dextroamphetamine XR (ADDERALL XR) 10 MG 24 hr capsule, Take 1 capsule by mouth Every Morning, Disp: 30 capsule, Rfl: 0  •  buPROPion XL (WELLBUTRIN XL) 150 MG 24 hr tablet, Take 1 tablet by mouth Daily., Disp: 90 tablet, Rfl: 1  •  levonorgestrel (MIRENA, 52 MG,) 20 MCG/24HR IUD, 1 each by Intrauterine route 1 (One)  Time., Disp: , Rfl:     No problems with medications.  Refills if needed done    No Known Allergies    Review of Systems  GENERAL:  Active home/with twins. Sleep is often hard falling/staying.   Apnea denied. No fever now.  SKIN: No rash/skin lesion of concern:   ENDO:  No syncope, near or diaphoretic sweaty spells.  HEENT: No recent head injury; or headache.   No vision change.  No hearing loss.  Ears without pain/drainage.  No sore throat.  No usual/significant nasal/sinus congestion/drainage. No epistaxis.  CHEST: No chest wall tenderness or mass. No usual cough, without wheeze.  No SOB; no hemoptysis.  CV: No pattern chest pain, palpitations, ankle edema.  GI: No heartburn, dysphagia.  No abdominal pain, diarrhea, constipation.  No rectal bleeding, or melena.    :  Voids without dysuria, or  incontinence to completion.  ORTHO: No painful/swollen joints but various on /off sore.  No sore neck or back.  No acute neck or back pain without recent injury.  NEURO: No dizziness, weakness of extremities.  No numbness/paresthesias.   PSYCH: No memory loss.  Mood variable; often anxious, mildly depressed but/and not suicidal.  Tries to tolerate stress .   Screening:  Gyne+  Mammogram: NA  Bone density: NA  Low dose CT chest: NA  GI: NA  Prostate: NA  Usual lab order  12m CBC, CMP, LIPID, TSH, fT4    Lab Results:  Results for orders placed or performed during the hospital encounter of 06/17/18   Comprehensive Metabolic Panel   Result Value Ref Range    Glucose 112 (H) 70 - 100 mg/dL    BUN 10 5 - 21 mg/dL    Creatinine 0.79 0.50 - 1.40 mg/dL    Sodium 146 (H) 135 - 145 mmol/L    Potassium 4.0 3.5 - 5.3 mmol/L    Chloride 105 98 - 110 mmol/L    CO2 25.0 24.0 - 31.0 mmol/L    Calcium 9.9 8.4 - 10.4 mg/dL    Total Protein 8.0 6.3 - 8.7 g/dL    Albumin 5.00 3.50 - 5.00 g/dL    ALT (SGPT) 33 0 - 54 U/L    AST (SGOT) 24 7 - 45 U/L    Alkaline Phosphatase 57 24 - 120 U/L    Total Bilirubin 0.3 0.1 - 1.0 mg/dL    eGFR Non  African Amer 90 >60 mL/min/1.73    Globulin 3.0 gm/dL    A/G Ratio 1.7 1.1 - 2.5 g/dL    BUN/Creatinine Ratio 12.7 7.0 - 25.0    Anion Gap 16.0 (H) 4.0 - 13.0 mmol/L   hCG, Serum, Qualitative   Result Value Ref Range    HCG Qualitative Negative Negative   Lipase   Result Value Ref Range    Lipase 45 23 - 203 U/L   Ethanol   Result Value Ref Range    Ethanol % 0.090 (H) <0.010 %   Urine Drug Screen - Urine, Clean Catch   Result Value Ref Range    Amphetamine Screen, Urine Negative Negative    Barbiturates Screen, Urine Negative Negative    Benzodiazepine Screen, Urine Negative Negative    Cocaine Screen, Urine Negative Negative    Methadone Screen, Urine Negative Negative    Opiate Screen Negative Negative    Phencyclidine (PCP), Urine Negative Negative    THC, Screen, Urine Negative Negative   CBC Auto Differential   Result Value Ref Range    WBC 6.80 4.80 - 10.80 10*3/mm3    RBC 4.66 4.20 - 5.40 10*6/mm3    Hemoglobin 13.4 12.0 - 16.0 g/dL    Hematocrit 39.6 37.0 - 47.0 %    MCV 85.0 82.0 - 98.0 fL    MCH 28.8 28.0 - 32.0 pg    MCHC 33.8 33.0 - 36.0 g/dL    RDW 12.9 12.0 - 15.0 %    RDW-SD 39.8 (L) 40.0 - 54.0 fl    MPV 9.9 6.0 - 12.0 fL    Platelets 309 130 - 400 10*3/mm3    Neutrophil % 66.3 39.0 - 78.0 %    Lymphocyte % 25.9 15.0 - 45.0 %    Monocyte % 6.3 4.0 - 12.0 %    Eosinophil % 0.3 0.0 - 4.0 %    Basophil % 0.9 0.0 - 2.0 %    Immature Grans % 0.3 0.0 - 5.0 %    Neutrophils, Absolute 4.51 1.87 - 8.40 10*3/mm3    Lymphocytes, Absolute 1.76 0.72 - 4.86 10*3/mm3    Monocytes, Absolute 0.43 0.19 - 1.30 10*3/mm3    Eosinophils, Absolute 0.02 0.00 - 0.70 10*3/mm3    Basophils, Absolute 0.06 0.00 - 0.20 10*3/mm3    Immature Grans, Absolute 0.02 0.00 - 0.03 10*3/mm3    nRBC 0.0 0.0 - 0.0 /100 WBC   BNP   Result Value Ref Range    proBNP 13.0 0.0 - 450.0 pg/mL   Light Blue Top   Result Value Ref Range    Extra Tube hold for add-on    Green Top (Gel)   Result Value Ref Range    Extra Tube Hold for add-ons.   "  Lavender Top   Result Value Ref Range    Extra Tube hold for add-on    Red Top   Result Value Ref Range    Extra Tube Hold for add-ons.        A1C:  Lab Results - Last 18 Months   Lab Units 12/05/17  1429   HEMOGLOBIN A1C % 5.10     PSA:No results for input(s): PSA in the last 49951 hours.  CBC:  Lab Results - Last 18 Months   Lab Units 06/17/18  0353 05/09/18  1400   WBC 10*3/mm3 6.80 6.08   HEMOGLOBIN g/dL 13.4 13.0   HEMATOCRIT % 39.6 37.7   PLATELETS 10*3/mm3 309 303      BMP/CMP:  Lab Results - Last 18 Months   Lab Units 06/17/18  0353 05/09/18  1510 05/09/18  1400 12/05/17  1429   SODIUM mmol/L 146*  --  142 139   SODIUM, ARTERIAL mmol/L  --  140  --   --    POTASSIUM mmol/L 4.0  --  3.8 4.5   CHLORIDE mmol/L 105  --  107 105   TOTAL CO2 mmol/L  --   --   --  23.0*   CO2 mmol/L 25.0  --  20.0*  --    GLUCOSE mg/dL  --   --   --  80   BUN mg/dL 10  --  13 10   CREATININE mg/dL 0.79  --  0.60 0.72   EGFR IF NONAFRICN AM mL/min/1.73 90  --  124 100   EGFR IF AFRICN AM mL/min/1.73  --   --   --  122   CALCIUM mg/dL 9.9  --  9.8 9.5     HEPATIC:  Lab Results - Last 18 Months   Lab Units 06/17/18  0353 05/09/18  1400 12/05/17  1429   ALT (SGPT) U/L 33 31 35   AST (SGOT) U/L 24 22 19   ALK PHOS U/L 57 59 56     THYROID:  Lab Results - Last 18 Months   Lab Units 05/09/18  1400   TSH mIU/mL 1.360       Objective   /80 (BP Location: Right arm, Patient Position: Sitting, Cuff Size: Adult)   Pulse 108   Temp 98.6 °F (37 °C) (Oral)   Resp 18   Ht 167.6 cm (66\")   Wt 73.5 kg (162 lb)   SpO2 98%   BMI 26.15 kg/m²   Body mass index is 26.15 kg/m².    Physical Exam  GENERAL:  Well nourished/developed in no acute distress initially; then became anxious/upset and had to lay down and talking with her calmed down.   SKIN: Turgor excellent.  HEENT: Normal cephalic without trauma.  Pupils equal round reactive to light. Extraocular motions full without nystagmus.   No thyromegaly, mass, tenderness, lymphadenopathy and " supple.  CV: Regular rhythm.  No murmur, gallop,  edema.   CHEST: No chest wall tenderness or mass.   LUNGS: Symmetric motion with clear to auscultation.   ABD: Soft, nontender without mass.   ORTHO: Symmetric extremities without swelling/point tenderness.  Full gross range of motion.  NEURO: CN 2-12 grossly intact.  Symmetric facies and UE/LE. 3/5 strength throughout. 1/4 x bicep equal reflexes.  Nonfocal use extremities. Speech clear.    PSYCH:  Oriented x 3.  Eventually pleasant calm, well kept with purposeful/directed conservation with intact short/long gross memory.      Assessment/Plan     1. Cardiac arrhythmia, unspecified cardiac arrhythmia type    2. Anxiety    3. Attention deficit hyperactivity disorder (ADHD), unspecified ADHD type    4. Depression, unspecified depression type    5. Insomnia, unspecified type      Overall; doing well/better with current Rx.     Rx: reviewed/changes:  New Medications Ordered This Visit   Medications   • buPROPion XL (WELLBUTRIN XL) 150 MG 24 hr tablet     Sig: Take 1 tablet by mouth Daily.     Dispense:  90 tablet     Refill:  1       LAB/Testing/Referrals: reviewed/orders:   Today: none  No orders of the defined types were placed in this encounter.    Chronic/recurrent labs above or change to:   Same; before she leaves     Discussions:   Find MD in [facility] now; for f/u after moving out there (suspect we cannot Rx her Adderall out there)  Body mass index is 26.15 kg/m².  Patient's Body mass index is 26.15 kg/m². BMI is within normal parameters. No follow-up required..    Tobacco use reviewed:  Non-smoker      Patient Instructions   Suggest you get appointment now with florentino Galindo in [facility] you plan to use.      Labs here before leaving    Leave with full bottles of your Rx (as unlikely I can write all your Rx long distance).       Follow up: Return for lab end of april;  option Dr marley after if she wants.  Future Appointments   Date Time Provider Department Center    4/29/2019  1:30 PM LAB [facility] None

## 2019-03-05 DIAGNOSIS — F41.9 ANXIETY: ICD-10-CM

## 2019-03-05 DIAGNOSIS — F90.9 ATTENTION DEFICIT HYPERACTIVITY DISORDER (ADHD), UNSPECIFIED ADHD TYPE: ICD-10-CM

## 2019-03-05 RX ORDER — DEXTROAMPHETAMINE SACCHARATE, AMPHETAMINE ASPARTATE MONOHYDRATE, DEXTROAMPHETAMINE SULFATE AND AMPHETAMINE SULFATE 2.5; 2.5; 2.5; 2.5 MG/1; MG/1; MG/1; MG/1
10 CAPSULE, EXTENDED RELEASE ORAL EVERY MORNING
Qty: 30 CAPSULE | Refills: 0 | Status: SHIPPED | OUTPATIENT
Start: 2019-03-05 | End: 2019-04-05 | Stop reason: SDUPTHER

## 2019-03-05 RX ORDER — ALPRAZOLAM 0.5 MG/1
0.5 TABLET ORAL 2 TIMES DAILY PRN
Qty: 20 TABLET | Refills: 0 | Status: SHIPPED | OUTPATIENT
Start: 2019-03-05 | End: 2019-04-05 | Stop reason: SDUPTHER

## 2019-04-05 DIAGNOSIS — F41.9 ANXIETY: ICD-10-CM

## 2019-04-05 DIAGNOSIS — F90.9 ATTENTION DEFICIT HYPERACTIVITY DISORDER (ADHD), UNSPECIFIED ADHD TYPE: ICD-10-CM

## 2019-04-05 RX ORDER — DEXTROAMPHETAMINE SACCHARATE, AMPHETAMINE ASPARTATE MONOHYDRATE, DEXTROAMPHETAMINE SULFATE AND AMPHETAMINE SULFATE 2.5; 2.5; 2.5; 2.5 MG/1; MG/1; MG/1; MG/1
10 CAPSULE, EXTENDED RELEASE ORAL EVERY MORNING
Qty: 30 CAPSULE | Refills: 0 | Status: SHIPPED | OUTPATIENT
Start: 2019-04-05 | End: 2019-04-29 | Stop reason: SDUPTHER

## 2019-04-05 RX ORDER — ALPRAZOLAM 0.5 MG/1
0.5 TABLET ORAL 2 TIMES DAILY PRN
Qty: 20 TABLET | Refills: 0 | Status: SHIPPED | OUTPATIENT
Start: 2019-04-05 | End: 2019-05-06 | Stop reason: SDUPTHER

## 2019-04-05 NOTE — TELEPHONE ENCOUNTER
"Vm \"refill my adderall and xanax\"    Protocol last refill   adderall xr, xanax 3-5-19    [place] Harman wnl's    "

## 2019-04-24 ENCOUNTER — OFFICE VISIT (OUTPATIENT)
Dept: FAMILY MEDICINE CLINIC | Facility: CLINIC | Age: 24
End: 2019-04-24

## 2019-04-24 VITALS
DIASTOLIC BLOOD PRESSURE: 78 MMHG | RESPIRATION RATE: 18 BRPM | WEIGHT: 161 LBS | TEMPERATURE: 99 F | OXYGEN SATURATION: 98 % | SYSTOLIC BLOOD PRESSURE: 104 MMHG | BODY MASS INDEX: 25.88 KG/M2 | HEIGHT: 66 IN | HEART RATE: 124 BPM

## 2019-04-24 DIAGNOSIS — F32.A DEPRESSION, UNSPECIFIED DEPRESSION TYPE: ICD-10-CM

## 2019-04-24 DIAGNOSIS — F90.9 ATTENTION DEFICIT HYPERACTIVITY DISORDER (ADHD), UNSPECIFIED ADHD TYPE: ICD-10-CM

## 2019-04-24 DIAGNOSIS — F41.9 ANXIETY: ICD-10-CM

## 2019-04-24 DIAGNOSIS — I49.9 CARDIAC ARRHYTHMIA, UNSPECIFIED CARDIAC ARRHYTHMIA TYPE: Primary | ICD-10-CM

## 2019-04-24 PROCEDURE — 99214 OFFICE O/P EST MOD 30 MIN: CPT | Performed by: FAMILY MEDICINE

## 2019-04-24 RX ORDER — LORAZEPAM 0.5 MG/1
0.5 TABLET ORAL EVERY 6 HOURS PRN
Qty: 30 TABLET | Refills: 0 | Status: SHIPPED | OUTPATIENT
Start: 2019-04-24

## 2019-04-24 NOTE — PATIENT INSTRUCTIONS
"Xanax twice a day through the move.    Ativan for any \"breakthrough\" anxiety along the way.     Only advise using both; be sure no sedation.    "

## 2019-04-24 NOTE — PROGRESS NOTES
"Subjective   Suzi Capone is a 23 y.o. female presenting with chief complaint of:   Chief Complaint   Patient presents with   • Anxiety     \"panic attacks often, past week or so i have felt faint and having full blown attacks where I have to lay down and try to deep breath through it\" also in process of moving and pt tearful   • Dizziness     \"feeling faint, sweating\"       History of Present Illness :  Alone.  Here for primarily an acute issue today; increased anxiety..   Has multiple chronic problems to consider that might have a bearing on today's issues; not an interval appointment.  She has chronic anxiety with overtones of depression.  She is moving to [place]; which will take her away from close family.  5.2.19 moving company coming.  5.6.19; moves to [place].  She has had increased anxiety to the point of panic (associated with faster heart rate extreme anxiety attacks).    Chronic/acute problems reviewed today:   1. Cardiac arrhythmia, unspecified cardiac arrhythmia type: Benign exam and Holter so this point.  So corresponds with increased anxiety that is seems to be a sinus tach initiated with anxiety.   2. Anxiety chronic/variable.  Increased with the upcoming move to [place] is getting closer.   3. Depression, unspecified depression type chronic/variable.  Part of her anxiety; but not certainly the major part.  Nonsuicidal.   4. Attention deficit hyperactivity disorder (ADHD), unspecified ADHD type chronic/variable.  Part of all the above with his association primarily towards difficulty maintaining attention.     Has an/another acute issue today: None.    The following portions of the patient's history were reviewed and updated as appropriate: allergies, current medications, past family history, past medical history, past social history, past surgical history and problem list.      Current Outpatient Medications:   •  ALPRAZolam (XANAX) 0.5 MG tablet, Take 1 tablet by mouth 2 (Two) Times a " Day As Needed for Anxiety., Disp: 20 tablet, Rfl: 0  •  amphetamine-dextroamphetamine XR (ADDERALL XR) 10 MG 24 hr capsule, Take 1 capsule by mouth Every Morning, Disp: 30 capsule, Rfl: 0  •  buPROPion XL (WELLBUTRIN XL) 150 MG 24 hr tablet, Take 1 tablet by mouth Daily., Disp: 90 tablet, Rfl: 1  •  levonorgestrel (MIRENA, 52 MG,) 20 MCG/24HR IUD, 1 each by Intrauterine route 1 (One) Time., Disp: , Rfl:   •  LORazepam (ATIVAN) 0.5 MG tablet, Take 1 tablet by mouth Every 6 (Six) Hours As Needed for Anxiety., Disp: 30 tablet, Rfl: 0    No problems with medications.  Refills if needed done    No Known Allergies    Review of Systems  GENERAL:  Active home/with twins. Sleep is often hard falling/staying.   Apnea denied. No fever now.  SKIN: No rash/skin lesion of concern:   ENDO:  No syncope, near or diaphoretic sweaty spells.  HEENT: No recent head injury; or headache.   No vision change.  No hearing loss.  Ears without pain/drainage.  No sore throat.  No usual/significant nasal/sinus congestion/drainage. No epistaxis.  CHEST: No chest wall tenderness or mass. No usual cough, without wheeze.  No SOB; no hemoptysis.  CV: No pattern chest pain, palpitations, ankle edema.  GI: No heartburn, dysphagia.  No abdominal pain, diarrhea, constipation.  No rectal bleeding, or melena.    :  Voids without dysuria, or  incontinence to completion.  ORTHO: No painful/swollen joints but various on /off sore.  No sore neck or back.  No acute neck or back pain without recent injury.  NEURO: No dizziness, weakness of extremities.  No numbness/paresthesias.   PSYCH: No memory loss.  Mood variable; often anxious, mildly depressed but/and not suicidal; more anxiety lately.  Tries to tolerate stress .   Screening:  Gyne+  Mammogram: NA   Bone density: NA  Low dose CT chest: NA  GI: NA  Prostate: NA  Usual lab order  12m CBC, CMP, LIPID, TSH, fT4    Lab Results:  Results for orders placed or performed during the hospital encounter of 06/17/18    Comprehensive Metabolic Panel   Result Value Ref Range    Glucose 112 (H) 70 - 100 mg/dL    BUN 10 5 - 21 mg/dL    Creatinine 0.79 0.50 - 1.40 mg/dL    Sodium 146 (H) 135 - 145 mmol/L    Potassium 4.0 3.5 - 5.3 mmol/L    Chloride 105 98 - 110 mmol/L    CO2 25.0 24.0 - 31.0 mmol/L    Calcium 9.9 8.4 - 10.4 mg/dL    Total Protein 8.0 6.3 - 8.7 g/dL    Albumin 5.00 3.50 - 5.00 g/dL    ALT (SGPT) 33 0 - 54 U/L    AST (SGOT) 24 7 - 45 U/L    Alkaline Phosphatase 57 24 - 120 U/L    Total Bilirubin 0.3 0.1 - 1.0 mg/dL    eGFR Non African Amer 90 >60 mL/min/1.73    Globulin 3.0 gm/dL    A/G Ratio 1.7 1.1 - 2.5 g/dL    BUN/Creatinine Ratio 12.7 7.0 - 25.0    Anion Gap 16.0 (H) 4.0 - 13.0 mmol/L   hCG, Serum, Qualitative   Result Value Ref Range    HCG Qualitative Negative Negative   Lipase   Result Value Ref Range    Lipase 45 23 - 203 U/L   Ethanol   Result Value Ref Range    Ethanol % 0.090 (H) <0.010 %   Urine Drug Screen - Urine, Clean Catch   Result Value Ref Range    Amphetamine Screen, Urine Negative Negative    Barbiturates Screen, Urine Negative Negative    Benzodiazepine Screen, Urine Negative Negative    Cocaine Screen, Urine Negative Negative    Methadone Screen, Urine Negative Negative    Opiate Screen Negative Negative    Phencyclidine (PCP), Urine Negative Negative    THC, Screen, Urine Negative Negative   CBC Auto Differential   Result Value Ref Range    WBC 6.80 4.80 - 10.80 10*3/mm3    RBC 4.66 4.20 - 5.40 10*6/mm3    Hemoglobin 13.4 12.0 - 16.0 g/dL    Hematocrit 39.6 37.0 - 47.0 %    MCV 85.0 82.0 - 98.0 fL    MCH 28.8 28.0 - 32.0 pg    MCHC 33.8 33.0 - 36.0 g/dL    RDW 12.9 12.0 - 15.0 %    RDW-SD 39.8 (L) 40.0 - 54.0 fl    MPV 9.9 6.0 - 12.0 fL    Platelets 309 130 - 400 10*3/mm3    Neutrophil % 66.3 39.0 - 78.0 %    Lymphocyte % 25.9 15.0 - 45.0 %    Monocyte % 6.3 4.0 - 12.0 %    Eosinophil % 0.3 0.0 - 4.0 %    Basophil % 0.9 0.0 - 2.0 %    Immature Grans % 0.3 0.0 - 5.0 %    Neutrophils, Absolute  4.51 1.87 - 8.40 10*3/mm3    Lymphocytes, Absolute 1.76 0.72 - 4.86 10*3/mm3    Monocytes, Absolute 0.43 0.19 - 1.30 10*3/mm3    Eosinophils, Absolute 0.02 0.00 - 0.70 10*3/mm3    Basophils, Absolute 0.06 0.00 - 0.20 10*3/mm3    Immature Grans, Absolute 0.02 0.00 - 0.03 10*3/mm3    nRBC 0.0 0.0 - 0.0 /100 WBC   BNP   Result Value Ref Range    proBNP 13.0 0.0 - 450.0 pg/mL   Light Blue Top   Result Value Ref Range    Extra Tube hold for add-on    Green Top (Gel)   Result Value Ref Range    Extra Tube Hold for add-ons.    Lavender Top   Result Value Ref Range    Extra Tube hold for add-on    Red Top   Result Value Ref Range    Extra Tube Hold for add-ons.        A1C:  Lab Results - Last 18 Months   Lab Units 12/05/17  1429   HEMOGLOBIN A1C % 5.10     PSA:No results for input(s): PSA in the last 27991 hours.  CBC:  Lab Results - Last 18 Months   Lab Units 06/17/18  0353 05/09/18  1400   WBC 10*3/mm3 6.80 6.08   HEMOGLOBIN g/dL 13.4 13.0   HEMATOCRIT % 39.6 37.7   PLATELETS 10*3/mm3 309 303      BMP/CMP:  Lab Results - Last 18 Months   Lab Units 06/17/18  0353 05/09/18  1510 05/09/18  1400 12/05/17  1429   SODIUM mmol/L 146*  --  142 139   SODIUM, ARTERIAL mmol/L  --  140  --   --    POTASSIUM mmol/L 4.0  --  3.8 4.5   CHLORIDE mmol/L 105  --  107 105   TOTAL CO2 mmol/L  --   --   --  23.0*   CO2 mmol/L 25.0  --  20.0*  --    GLUCOSE mg/dL  --   --   --  80   BUN mg/dL 10  --  13 10   CREATININE mg/dL 0.79  --  0.60 0.72   EGFR IF NONAFRICN AM mL/min/1.73 90  --  124 100   EGFR IF AFRICN AM mL/min/1.73  --   --   --  122   CALCIUM mg/dL 9.9  --  9.8 9.5     HEPATIC:  Lab Results - Last 18 Months   Lab Units 06/17/18  0353 05/09/18  1400 12/05/17  1429   ALT (SGPT) U/L 33 31 35   AST (SGOT) U/L 24 22 19   ALK PHOS U/L 57 59 56     THYROID:  Lab Results - Last 18 Months   Lab Units 05/09/18  1400   TSH mIU/mL 1.360       Objective   /78 (BP Location: Right arm, Patient Position: Sitting, Cuff Size: Adult)   Pulse  "(!) 124   Temp 99 °F (37.2 °C) (Oral)   Resp 18   Ht 167.6 cm (66\")   Wt 73 kg (161 lb)   SpO2 98%   Breastfeeding? No Comment: IUD  BMI 25.99 kg/m²   Body mass index is 25.99 kg/m².    Physical Exam  GENERAL:  Well nourished/developed in no acute distress.  SKIN: Turgor excellent.  HEENT: Normal cephalic without trauma.  Pupils equal round reactive to light. Extraocular motions full without nystagmus.   No thyromegaly, mass, tenderness, lymphadenopathy and supple.  CV: Regular rhythm.  No murmur, gallop,  edema.   CHEST: No chest wall tenderness or mass.   LUNGS: Symmetric motion with clear to auscultation.   ABD: Soft, nontender without mass.   ORTHO: Symmetric extremities without swelling/point tenderness.  Full gross range of motion.  NEURO: CN 2-12 grossly intact.  Symmetric facies and UE/LE. 3/5 strength throughout. 1/4 x bicep equal reflexes.  Nonfocal use extremities. Speech clear.    PSYCH:  Oriented x 3.  Eventually pleasant calm, well kept with purposeful/directed conservation with intact short/long gross memory.     Assessment/Plan     1. Cardiac arrhythmia, unspecified cardiac arrhythmia type    2. Anxiety    3. Depression, unspecified depression type    4. Attention deficit hyperactivity disorder (ADHD), unspecified ADHD type      Increased anxiety with her upcoming move    Rx: reviewed/changes:  New Medications Ordered This Visit   Medications   • LORazepam (ATIVAN) 0.5 MG tablet     Sig: Take 1 tablet by mouth Every 6 (Six) Hours As Needed for Anxiety.     Dispense:  30 tablet     Refill:  0   Change Xanax to regular twice daily at least so she gets to [place].    LAB/Testing/Referrals: reviewed/orders:   Today:   No orders of the defined types were placed in this encounter.    Chronic/recurrent labs above or change to:   Same; but will be in [place].      Discussions:   Reason for medications.  Need to get a counselor and physician as soon as she arrives in [place].  Body mass index is " "25.99 kg/m².  Patient's Body mass index is 25.99 kg/m². BMI is within normal parameters. No follow-up required..    Tobacco use reviewed:  Non-smoker      Patient Instructions   Xanax twice a day through the move.    Ativan for any \"breakthrough\" anxiety along the way.     Only advise using both; be sure no sedation.        Follow up: Return for Dr Acevedo-, as needed;.  Future Appointments   Date Time Provider Department Center   4/29/2019  1:30 PM LAB [facility] None       "

## 2019-04-26 DIAGNOSIS — R00.2 PALPITATIONS: ICD-10-CM

## 2019-04-26 DIAGNOSIS — I10 ESSENTIAL HYPERTENSION: Primary | ICD-10-CM

## 2019-04-26 DIAGNOSIS — F32.A DEPRESSION, UNSPECIFIED DEPRESSION TYPE: ICD-10-CM

## 2019-04-26 DIAGNOSIS — G47.00 INSOMNIA, UNSPECIFIED TYPE: ICD-10-CM

## 2019-04-29 ENCOUNTER — RESULTS ENCOUNTER (OUTPATIENT)
Dept: FAMILY MEDICINE CLINIC | Facility: CLINIC | Age: 24
End: 2019-04-29

## 2019-04-29 DIAGNOSIS — F90.9 ATTENTION DEFICIT HYPERACTIVITY DISORDER (ADHD), UNSPECIFIED ADHD TYPE: ICD-10-CM

## 2019-04-29 DIAGNOSIS — R00.2 PALPITATIONS: ICD-10-CM

## 2019-04-29 DIAGNOSIS — I10 ESSENTIAL HYPERTENSION: ICD-10-CM

## 2019-04-29 DIAGNOSIS — G47.00 INSOMNIA, UNSPECIFIED TYPE: ICD-10-CM

## 2019-04-29 DIAGNOSIS — F32.A DEPRESSION, UNSPECIFIED DEPRESSION TYPE: ICD-10-CM

## 2019-04-29 RX ORDER — DEXTROAMPHETAMINE SACCHARATE, AMPHETAMINE ASPARTATE MONOHYDRATE, DEXTROAMPHETAMINE SULFATE AND AMPHETAMINE SULFATE 2.5; 2.5; 2.5; 2.5 MG/1; MG/1; MG/1; MG/1
10 CAPSULE, EXTENDED RELEASE ORAL EVERY MORNING
Qty: 30 CAPSULE | Refills: 0 | Status: SHIPPED | OUTPATIENT
Start: 2019-04-29

## 2019-04-29 NOTE — TELEPHONE ENCOUNTER
"\"we are flying out/[place] May 6th, an dmy adderall wont be able to get filled until May that day, is there anyway I can get it filled the 5th?    rx will be ready to fill the 5th, per protocol and rx posted with refill date of 4th so pt can get it on the 5th    [place] brigido castro      "

## 2019-05-01 LAB
ALBUMIN SERPL-MCNC: 5 G/DL (ref 3.5–5.2)
ALBUMIN/GLOB SERPL: 2.2 G/DL
ALP SERPL-CCNC: 60 U/L (ref 39–117)
ALT SERPL-CCNC: 26 U/L (ref 1–33)
AST SERPL-CCNC: 15 U/L (ref 1–32)
BASOPHILS # BLD AUTO: 0.07 10*3/MM3 (ref 0–0.2)
BASOPHILS NFR BLD AUTO: 1 % (ref 0–1.5)
BILIRUB SERPL-MCNC: 0.4 MG/DL (ref 0.2–1.2)
BUN SERPL-MCNC: 8 MG/DL (ref 6–20)
BUN/CREAT SERPL: 10.5 (ref 7–25)
CALCIUM SERPL-MCNC: 10.2 MG/DL (ref 8.6–10.5)
CHLORIDE SERPL-SCNC: 103 MMOL/L (ref 98–107)
CHOLEST SERPL-MCNC: 219 MG/DL (ref 0–200)
CHOLEST/HDLC SERPL: 5.09 {RATIO}
CO2 SERPL-SCNC: 27.2 MMOL/L (ref 22–29)
CREAT SERPL-MCNC: 0.76 MG/DL (ref 0.57–1)
EOSINOPHIL # BLD AUTO: 0.05 10*3/MM3 (ref 0–0.4)
EOSINOPHIL NFR BLD AUTO: 0.7 % (ref 0.3–6.2)
ERYTHROCYTE [DISTWIDTH] IN BLOOD BY AUTOMATED COUNT: 13 % (ref 12.3–15.4)
GLOBULIN SER CALC-MCNC: 2.3 GM/DL
GLUCOSE SERPL-MCNC: 96 MG/DL (ref 65–99)
HCT VFR BLD AUTO: 42.1 % (ref 34–46.6)
HDLC SERPL-MCNC: 43 MG/DL (ref 40–60)
HGB BLD-MCNC: 13.3 G/DL (ref 12–15.9)
IMM GRANULOCYTES # BLD AUTO: 0.02 10*3/MM3 (ref 0–0.05)
IMM GRANULOCYTES NFR BLD AUTO: 0.3 % (ref 0–0.5)
LDLC SERPL CALC-MCNC: 153 MG/DL (ref 0–100)
LYMPHOCYTES # BLD AUTO: 3.28 10*3/MM3 (ref 0.7–3.1)
LYMPHOCYTES NFR BLD AUTO: 48.3 % (ref 19.6–45.3)
MCH RBC QN AUTO: 29.1 PG (ref 26.6–33)
MCHC RBC AUTO-ENTMCNC: 31.6 G/DL (ref 31.5–35.7)
MCV RBC AUTO: 92.1 FL (ref 79–97)
MONOCYTES # BLD AUTO: 0.38 10*3/MM3 (ref 0.1–0.9)
MONOCYTES NFR BLD AUTO: 5.6 % (ref 5–12)
NEUTROPHILS # BLD AUTO: 2.99 10*3/MM3 (ref 1.7–7)
NEUTROPHILS NFR BLD AUTO: 44.1 % (ref 42.7–76)
NRBC BLD AUTO-RTO: 0 /100 WBC (ref 0–0.2)
PLATELET # BLD AUTO: 300 10*3/MM3 (ref 140–450)
POTASSIUM SERPL-SCNC: 4.7 MMOL/L (ref 3.5–5.2)
PROT SERPL-MCNC: 7.3 G/DL (ref 6–8.5)
RBC # BLD AUTO: 4.57 10*6/MM3 (ref 3.77–5.28)
SODIUM SERPL-SCNC: 141 MMOL/L (ref 136–145)
T4 FREE SERPL-MCNC: 1.22 NG/DL (ref 0.93–1.7)
TRIGL SERPL-MCNC: 116 MG/DL (ref 0–150)
TSH SERPL DL<=0.005 MIU/L-ACNC: 1.21 MIU/ML (ref 0.27–4.2)
VLDLC SERPL CALC-MCNC: 23.2 MG/DL
WBC # BLD AUTO: 6.79 10*3/MM3 (ref 3.4–10.8)

## 2019-05-06 DIAGNOSIS — F41.9 ANXIETY: ICD-10-CM

## 2019-05-06 DIAGNOSIS — G47.00 INSOMNIA, UNSPECIFIED TYPE: ICD-10-CM

## 2019-05-06 DIAGNOSIS — F32.A DEPRESSION, UNSPECIFIED DEPRESSION TYPE: ICD-10-CM

## 2019-05-06 RX ORDER — BUPROPION HYDROCHLORIDE 150 MG/1
150 TABLET ORAL DAILY
Qty: 90 TABLET | Refills: 1 | Status: SHIPPED | OUTPATIENT
Start: 2019-05-06

## 2019-05-06 RX ORDER — ALPRAZOLAM 0.5 MG/1
0.5 TABLET ORAL 2 TIMES DAILY PRN
Qty: 60 TABLET | Refills: 0 | Status: SHIPPED | OUTPATIENT
Start: 2019-05-06

## 2024-04-03 ENCOUNTER — APPOINTMENT (OUTPATIENT)
Dept: CT IMAGING | Facility: HOSPITAL | Age: 29
End: 2024-04-03
Payer: COMMERCIAL

## 2024-04-03 ENCOUNTER — HOSPITAL ENCOUNTER (EMERGENCY)
Facility: HOSPITAL | Age: 29
Discharge: HOME | End: 2024-04-03
Payer: COMMERCIAL

## 2024-04-03 VITALS
WEIGHT: 170 LBS | TEMPERATURE: 98 F | OXYGEN SATURATION: 98 % | SYSTOLIC BLOOD PRESSURE: 132 MMHG | BODY MASS INDEX: 27.32 KG/M2 | DIASTOLIC BLOOD PRESSURE: 86 MMHG | HEART RATE: 91 BPM | HEIGHT: 66 IN | RESPIRATION RATE: 18 BRPM

## 2024-04-03 DIAGNOSIS — N83.201 CYST OF RIGHT OVARY: Primary | ICD-10-CM

## 2024-04-03 LAB
ALBUMIN SERPL-MCNC: 5.2 G/DL (ref 3.5–5.2)
ALBUMIN/GLOB SERPL: 1.6 G/DL
ALP SERPL-CCNC: 73 U/L (ref 39–117)
ALT SERPL W P-5'-P-CCNC: 38 U/L (ref 1–33)
ANION GAP SERPL CALCULATED.3IONS-SCNC: 13 MMOL/L (ref 5–15)
AST SERPL-CCNC: 29 U/L (ref 1–32)
BASOPHILS # BLD AUTO: 0.08 10*3/MM3 (ref 0–0.2)
BASOPHILS NFR BLD AUTO: 0.9 % (ref 0–1.5)
BILIRUB SERPL-MCNC: 0.4 MG/DL (ref 0–1.2)
BILIRUB UR QL STRIP: NEGATIVE
BUN SERPL-MCNC: 9 MG/DL (ref 6–20)
BUN/CREAT SERPL: 14.1 (ref 7–25)
CALCIUM SPEC-SCNC: 10.1 MG/DL (ref 8.6–10.5)
CHLORIDE SERPL-SCNC: 100 MMOL/L (ref 98–107)
CLARITY UR: CLEAR
CO2 SERPL-SCNC: 23 MMOL/L (ref 22–29)
COLOR UR: YELLOW
CREAT SERPL-MCNC: 0.64 MG/DL (ref 0.57–1)
D-LACTATE SERPL-SCNC: 1.2 MMOL/L (ref 0.5–2)
DEPRECATED RDW RBC AUTO: 41.2 FL (ref 37–54)
EGFRCR SERPLBLD CKD-EPI 2021: 122.9 ML/MIN/1.73
EOSINOPHIL # BLD AUTO: 0.02 10*3/MM3 (ref 0–0.4)
EOSINOPHIL NFR BLD AUTO: 0.2 % (ref 0.3–6.2)
ERYTHROCYTE [DISTWIDTH] IN BLOOD BY AUTOMATED COUNT: 13.2 % (ref 12.3–15.4)
GLOBULIN UR ELPH-MCNC: 3.2 GM/DL
GLUCOSE SERPL-MCNC: 95 MG/DL (ref 65–99)
GLUCOSE UR STRIP-MCNC: NEGATIVE MG/DL
HCG SERPL QL: NEGATIVE
HCT VFR BLD AUTO: 41.1 % (ref 34–46.6)
HGB BLD-MCNC: 13.5 G/DL (ref 12–15.9)
HGB UR QL STRIP.AUTO: NEGATIVE
IMM GRANULOCYTES # BLD AUTO: 0.05 10*3/MM3 (ref 0–0.05)
IMM GRANULOCYTES NFR BLD AUTO: 0.6 % (ref 0–0.5)
KETONES UR QL STRIP: ABNORMAL
LEUKOCYTE ESTERASE UR QL STRIP.AUTO: NEGATIVE
LIPASE SERPL-CCNC: 21 U/L (ref 13–60)
LYMPHOCYTES # BLD AUTO: 1.96 10*3/MM3 (ref 0.7–3.1)
LYMPHOCYTES NFR BLD AUTO: 23 % (ref 19.6–45.3)
MCH RBC QN AUTO: 28.4 PG (ref 26.6–33)
MCHC RBC AUTO-ENTMCNC: 32.8 G/DL (ref 31.5–35.7)
MCV RBC AUTO: 86.5 FL (ref 79–97)
MONOCYTES # BLD AUTO: 0.4 10*3/MM3 (ref 0.1–0.9)
MONOCYTES NFR BLD AUTO: 4.7 % (ref 5–12)
NEUTROPHILS NFR BLD AUTO: 6.01 10*3/MM3 (ref 1.7–7)
NEUTROPHILS NFR BLD AUTO: 70.6 % (ref 42.7–76)
NITRITE UR QL STRIP: NEGATIVE
NRBC BLD AUTO-RTO: 0 /100 WBC (ref 0–0.2)
PH UR STRIP.AUTO: 5.5 [PH] (ref 5–8)
PLATELET # BLD AUTO: 378 10*3/MM3 (ref 140–450)
PMV BLD AUTO: 9.6 FL (ref 6–12)
POTASSIUM SERPL-SCNC: 3.7 MMOL/L (ref 3.5–5.2)
PROT SERPL-MCNC: 8.4 G/DL (ref 6–8.5)
PROT UR QL STRIP: ABNORMAL
RBC # BLD AUTO: 4.75 10*6/MM3 (ref 3.77–5.28)
SODIUM SERPL-SCNC: 136 MMOL/L (ref 136–145)
SP GR UR STRIP: 1.03 (ref 1–1.03)
UROBILINOGEN UR QL STRIP: ABNORMAL
WBC NRBC COR # BLD AUTO: 8.52 10*3/MM3 (ref 3.4–10.8)

## 2024-04-03 PROCEDURE — 85025 COMPLETE CBC W/AUTO DIFF WBC: CPT | Performed by: NURSE PRACTITIONER

## 2024-04-03 PROCEDURE — 83690 ASSAY OF LIPASE: CPT | Performed by: NURSE PRACTITIONER

## 2024-04-03 PROCEDURE — 25810000003 LACTATED RINGERS SOLUTION: Performed by: NURSE PRACTITIONER

## 2024-04-03 PROCEDURE — 25510000001 IOPAMIDOL 61 % SOLUTION: Performed by: NURSE PRACTITIONER

## 2024-04-03 PROCEDURE — 83605 ASSAY OF LACTIC ACID: CPT | Performed by: NURSE PRACTITIONER

## 2024-04-03 PROCEDURE — 80053 COMPREHEN METABOLIC PANEL: CPT | Performed by: NURSE PRACTITIONER

## 2024-04-03 PROCEDURE — 84703 CHORIONIC GONADOTROPIN ASSAY: CPT | Performed by: NURSE PRACTITIONER

## 2024-04-03 PROCEDURE — 81003 URINALYSIS AUTO W/O SCOPE: CPT | Performed by: NURSE PRACTITIONER

## 2024-04-03 PROCEDURE — 99285 EMERGENCY DEPT VISIT HI MDM: CPT

## 2024-04-03 PROCEDURE — 36415 COLL VENOUS BLD VENIPUNCTURE: CPT

## 2024-04-03 PROCEDURE — 74177 CT ABD & PELVIS W/CONTRAST: CPT

## 2024-04-03 RX ORDER — KETOROLAC TROMETHAMINE 10 MG/1
10 TABLET, FILM COATED ORAL EVERY 6 HOURS PRN
Qty: 15 TABLET | Refills: 0 | Status: SHIPPED | OUTPATIENT
Start: 2024-04-03 | End: 2024-04-08

## 2024-04-03 RX ADMIN — SODIUM CHLORIDE, POTASSIUM CHLORIDE, SODIUM LACTATE AND CALCIUM CHLORIDE 1000 ML: 600; 310; 30; 20 INJECTION, SOLUTION INTRAVENOUS at 16:46

## 2024-04-03 RX ADMIN — IOPAMIDOL 100 ML: 612 INJECTION, SOLUTION INTRAVENOUS at 17:50

## 2024-04-03 NOTE — ED PROVIDER NOTES
Subjective   History of Present Illness  Patient is a 28-year-old female presents the emergency department with bilateral lower quadrant abdominal pain intermittently for the past 2 weeks.  Patient did a televisit about 2 weeks ago and was treated for UTI because she was having dysuria.  She states she finished antibiotics about a week ago and started having pressure in the bladder area and abdominal pain with nausea over the past 2 days.  1 episode of diarrhea this morning she has had nausea no vomiting.  No known fever or chills. She was treated for uti based on symptoms on televisit. She did not have a urinalysis done.     History provided by:  Patient   used: No        Review of Systems   Constitutional: Negative.    HENT: Negative.     Eyes: Negative.    Respiratory: Negative.     Cardiovascular: Negative.    Gastrointestinal:         Patient is a 28-year-old female presents the emergency department with bilateral lower quadrant abdominal pain intermittently for the past 2 weeks.  Patient did a televisit about 2 weeks ago and was treated for UTI because she was having dysuria.  She states she finished antibiotics about a week ago and started having pressure in the bladder area and abdominal pain with nausea over the past 2 days.  1 episode of diarrhea this morning she has had nausea no vomiting.  No known fever or chills. She was treated for uti based on symptoms on televisit. She did not have a urinalysis done.      Endocrine: Negative.    Genitourinary: Negative.    Musculoskeletal: Negative.    Skin: Negative.    Allergic/Immunologic: Negative.    Neurological: Negative.    Hematological: Negative.    Psychiatric/Behavioral: Negative.     All other systems reviewed and are negative.      Past Medical History:   Diagnosis Date    Anxiety        No Known Allergies    Past Surgical History:   Procedure Laterality Date     SECTION      CYST REMOVAL      HYMENECTOMY      TONSILLECTOMY   "    WISDOM TOOTH EXTRACTION         Family History   Problem Relation Age of Onset    Diabetes Paternal Grandmother        Social History     Socioeconomic History    Marital status:      Spouse name: Nicholas    Number of children: 2    Years of education: 13   Tobacco Use    Smoking status: Former     Current packs/day: 0.00     Types: Cigarettes     Quit date: 9/28/2013     Years since quitting: 10.5    Smokeless tobacco: Never   Substance and Sexual Activity    Alcohol use: No    Drug use: Yes     Types: Marijuana    Sexual activity: Yes     Partners: Male     Birth control/protection: I.U.D.       Prior to Admission medications    Medication Sig Start Date End Date Taking? Authorizing Provider   ALPRAZolam (XANAX) 0.5 MG tablet Take 1 tablet by mouth 2 (Two) Times a Day As Needed for Anxiety. 5/6/19   Jaylon Acevedo MD   amphetamine-dextroamphetamine XR (ADDERALL XR) 10 MG 24 hr capsule Take 1 capsule by mouth Every Morning 4/29/19   Jaylon Acevedo MD   buPROPion XL (WELLBUTRIN XL) 150 MG 24 hr tablet Take 1 tablet by mouth Daily. 5/6/19   Jaylon Acevedo MD   levonorgestrel (MIRENA, 52 MG,) 20 MCG/24HR IUD 1 each by Intrauterine route 1 (One) Time.    Provider, MD Carlos   LORazepam (ATIVAN) 0.5 MG tablet Take 1 tablet by mouth Every 6 (Six) Hours As Needed for Anxiety. 4/24/19   Jaylon Acevedo MD       /86 (BP Location: Right arm, Patient Position: Sitting)   Pulse 91   Temp 98 °F (36.7 °C)   Resp 18   Ht 167.6 cm (66\")   Wt 77.1 kg (170 lb)   SpO2 98%   BMI 27.44 kg/m²     Objective   Physical Exam  Vitals and nursing note reviewed.   Constitutional:       Appearance: She is well-developed.   HENT:      Head: Normocephalic and atraumatic.   Eyes:      Conjunctiva/sclera: Conjunctivae normal.      Pupils: Pupils are equal, round, and reactive to light.   Neck:      Thyroid: No thyromegaly.      Trachea: No tracheal deviation.   Cardiovascular:      Rate and " Rhythm: Normal rate and regular rhythm.      Heart sounds: Normal heart sounds.   Pulmonary:      Effort: Pulmonary effort is normal. No respiratory distress.      Breath sounds: Normal breath sounds. No wheezing or rales.   Chest:      Chest wall: No tenderness.   Abdominal:      General: Bowel sounds are normal.      Palpations: Abdomen is soft.      Comments: Abd soft, non distended. Tenderness to bilat lower quadrants. No guarding or rebound noted.   Musculoskeletal:         General: Normal range of motion.      Cervical back: Normal range of motion and neck supple.   Skin:     General: Skin is warm and dry.   Neurological:      Mental Status: She is alert and oriented to person, place, and time.      Cranial Nerves: No cranial nerve deficit.      Deep Tendon Reflexes: Reflexes are normal and symmetric.   Psychiatric:         Behavior: Behavior normal.         Thought Content: Thought content normal.         Judgment: Judgment normal.         Procedures         Lab Results (last 24 hours)       Procedure Component Value Units Date/Time    CBC & Differential [[id number]]  (Abnormal) Collected: 04/03/24 1645    Specimen: Blood Updated: 04/03/24 1654    Narrative:      The following orders were created for panel order CBC & Differential.  Procedure                               Abnormality         Status                     ---------                               -----------         ------                     CBC Auto Differential[[id number]]        Abnormal            Final result                 Please view results for these tests on the individual orders.    Comprehensive Metabolic Panel [[id number]]  (Abnormal) Collected: 04/03/24 1645    Specimen: Blood Updated: 04/03/24 1711     Glucose 95 mg/dL      BUN 9 mg/dL      Creatinine 0.64 mg/dL      Sodium 136 mmol/L      Potassium 3.7 mmol/L      Chloride 100 mmol/L      CO2 23.0 mmol/L      Calcium 10.1 mg/dL      Total Protein 8.4 g/dL      Albumin 5.2 g/dL       ALT (SGPT) 38 U/L      AST (SGOT) 29 U/L      Alkaline Phosphatase 73 U/L      Total Bilirubin 0.4 mg/dL      Globulin 3.2 gm/dL      A/G Ratio 1.6 g/dL      BUN/Creatinine Ratio 14.1     Anion Gap 13.0 mmol/L      eGFR 122.9 mL/min/1.73     Narrative:      GFR Normal >60  Chronic Kidney Disease <60  Kidney Failure <15      Lipase [[id number]]  (Normal) Collected: 04/03/24 1645    Specimen: Blood Updated: 04/03/24 1706     Lipase 21 U/L     Lactic Acid, Plasma [[id number]]  (Normal) Collected: 04/03/24 1645    Specimen: Blood Updated: 04/03/24 1709     Lactate 1.2 mmol/L     hCG, Serum, Qualitative [[id number]]  (Normal) Collected: 04/03/24 1645    Specimen: Blood Updated: 04/03/24 1705     HCG Qualitative Negative    Urinalysis With Culture If Indicated - Urine, Clean Catch [[id number]]  (Abnormal) Collected: 04/03/24 1645    Specimen: Urine, Clean Catch Updated: 04/03/24 1655     Color, UA Yellow     Appearance, UA Clear     pH, UA 5.5     Specific Gravity, UA 1.029     Glucose, UA Negative     Ketones, UA 80 mg/dL (3+)     Bilirubin, UA Negative     Blood, UA Negative     Protein, UA Trace     Leuk Esterase, UA Negative     Nitrite, UA Negative     Urobilinogen, UA 1.0 E.U./dL    Narrative:      In absence of clinical symptoms, the presence of pyuria, bacteria, and/or nitrites on the urinalysis result does not correlate with infection.  Urine microscopic not indicated.    CBC Auto Differential [[id number]]  (Abnormal) Collected: 04/03/24 1645    Specimen: Blood Updated: 04/03/24 1654     WBC 8.52 10*3/mm3      RBC 4.75 10*6/mm3      Hemoglobin 13.5 g/dL      Hematocrit 41.1 %      MCV 86.5 fL      MCH 28.4 pg      MCHC 32.8 g/dL      RDW 13.2 %      RDW-SD 41.2 fl      MPV 9.6 fL      Platelets 378 10*3/mm3      Neutrophil % 70.6 %      Lymphocyte % 23.0 %      Monocyte % 4.7 %      Eosinophil % 0.2 %      Basophil % 0.9 %      Immature Grans % 0.6 %      Neutrophils, Absolute 6.01 10*3/mm3      Lymphocytes, Absolute  1.96 10*3/mm3      Monocytes, Absolute 0.40 10*3/mm3      Eosinophils, Absolute 0.02 10*3/mm3      Basophils, Absolute 0.08 10*3/mm3      Immature Grans, Absolute 0.05 10*3/mm3      nRBC 0.0 /100 WBC             CT Abdomen Pelvis With Contrast   Final Result   1. Small cyst of the right ovary. The uterus and adnexa are otherwise   unremarkable. Minimal free fluid in the cul-de-sac is felt to be   physiologic.   2. Mild gaseous distention of the colon. No focal bowel wall thickening   or mechanical obstruction. Normal appendix.   3. No evidence of nephrolithiasis or obstructive uropathy. There are   small cortical cysts of each kidney. No perinephric fluid collection   present.   4. Mild steatosis of the liver.   5. Mild diastases of the abdominal musculature at the umbilicus with a   small fat-containing periumbilical hernia.   6. Normal appearance of the urinary bladder..               This report was signed and finalized on 4/3/2024 6:36 PM by Dr. Rufus Li MD.              ED Course  ED Course as of 04/04/24 0950   Wed Apr 03, 2024 1854 IMPRESSION:  1. Small cyst of the right ovary. The uterus and adnexa are otherwise  unremarkable. Minimal free fluid in the cul-de-sac is felt to be  physiologic.  2. Mild gaseous distention of the colon. No focal bowel wall thickening  or mechanical obstruction. Normal appendix.  3. No evidence of nephrolithiasis or obstructive uropathy. There are  small cortical cysts of each kidney. No perinephric fluid collection  present.  4. Mild steatosis of the liver.  5. Mild diastases of the abdominal musculature at the umbilicus with a  small fat-containing periumbilical hernia.  6. Normal appearance of the urinary bladder..   [CW]   1854 lab studies are CMP is unremarkable.  hCG is negative.  Lipase is 21.  Lactate is 1.2.  CBC no leukocytosis.  Urinalysis shows trace amount of protein otherwise unremarkable.  CT of the abdomen pelvis shows a small cyst of the right ovary  otherwise unremarkable.  Minimal fluid in the cul-de-sac.  Mild gaseous distention of the colon no bowel wall thickening or mechanical obstruction normal appendix.  Reviewed the studies with the patient.  I advised the patient to follow-up with her gynecologist.  Will prescribe her Toradol for pain.  She received lactated Ringer's while emergency department.  She has not required any pain medication.  She is in agreement with the care plan.  Reviewed reasons to return emergency department including increased pain, fever, vomiting.  She is in agreement with the care plan and voices understanding of instruction.  She will be discharged shortly in stable condition [CW]      ED Course User Index  [CW] Evelyn Rajput APRN        Medical Decision Making  Patient is a 28-year-old female presents the emergency department with bilateral lower quadrant abdominal pain intermittently for the past 2 weeks.  Patient did a televisit about 2 weeks ago and was treated for UTI because she was having dysuria.  She states she finished antibiotics about a week ago and started having pressure in the bladder area and abdominal pain with nausea over the past 2 days.  1 episode of diarrhea this morning she has had nausea no vomiting.  No known fever or chills. She was treated for uti based on symptoms on televisit. She did not have a urinalysis done.   Course of treatment in the er: Nontoxic-appearing 28-year-old female presents emergency department with lower abdominal pain for the past 2 weeks intermittently.  She has had some urinary frequency as well.  She denies any fever but has had nausea without vomiting.  She states the pain has been worse today.  She was treated for urinary tract infection per a televisit about 2 weeks ago.  She finished antibiotics a week ago.  She states the pain is gotten worse.  She states she had an episode of diarrhea this morning as well.  Abdomen is soft, nondistended.  She has tenderness in the  lower quadrants bilaterally.  No guarding or rebound.  CT of the abdomen pelvis, laboratory studies, IV lactated Ringer's has been ordered.  Differential diagnosis: uti; pyelonephritis; colitis; appendicitis; sbo; constipation   Labs Reviewed  COMPREHENSIVE METABOLIC PANEL - Abnormal; Notable for the following components:     ALT (SGPT)                    38 (*)              All other components within normal limits         Narrative: GFR Normal >60                  Chronic Kidney Disease <60                  Kidney Failure <15                    URINALYSIS W/ CULTURE IF INDICATED - Abnormal; Notable for the following components:     Ketones, UA                     (*)                  Protein, UA                   Trace (*)            All other components within normal limits         Narrative: In absence of clinical symptoms, the presence of pyuria, bacteria, and/or nitrites on the urinalysis result does not correlate with infection.                  Urine microscopic not indicated.  CBC WITH AUTO DIFFERENTIAL - Abnormal; Notable for the following components:     Monocyte %                    4.7 (*)                Eosinophil %                  0.2 (*)                Immature Grans %              0.6 (*)             All other components within normal limits  LIPASE - Normal  LACTIC ACID, PLASMA - Normal  HCG, SERUM, QUALITATIVE - Normal  CBC AND DIFFERENTIAL  CT Abdomen Pelvis With Contrast   Final Result    1. Small cyst of the right ovary. The uterus and adnexa are otherwise    unremarkable. Minimal free fluid in the cul-de-sac is felt to be    physiologic.    2. Mild gaseous distention of the colon. No focal bowel wall thickening    or mechanical obstruction. Normal appendix.    3. No evidence of nephrolithiasis or obstructive uropathy. There are    small cortical cysts of each kidney. No perinephric fluid collection    present.    4. Mild steatosis of the liver.    5. Mild diastases of the abdominal  musculature at the umbilicus with a    small fat-containing periumbilical hernia.    6. Normal appearance of the urinary bladder..                   This report was signed and finalized on 4/3/2024 6:36 PM by Dr. Rufus Li MD.        lab studies are CMP is unremarkable.  hCG is negative.  Lipase is 21.  Lactate is 1.2.  CBC no leukocytosis.  Urinalysis shows trace amount of protein otherwise unremarkable.  CT of the abdomen pelvis shows a small cyst of the right ovary otherwise unremarkable.  Minimal fluid in the cul-de-sac.  Mild gaseous distention of the colon no bowel wall thickening or mechanical obstruction normal appendix.  Reviewed the studies with the patient.  I advised the patient to follow-up with her gynecologist.  Will prescribe her Toradol for pain.  She received lactated Ringer's while emergency department.  She has not required any pain medication.  She is in agreement with the care plan.  Reviewed reasons to return emergency department including increased pain, fever, vomiting.  She is in agreement with the care plan and voices understanding of instruction.  She will be discharged shortly in stable condition        Problems Addressed:  Cyst of right ovary: complicated acute illness or injury    Amount and/or Complexity of Data Reviewed  Labs: ordered. Decision-making details documented in ED Course.  Radiology: ordered. Decision-making details documented in ED Course.    Risk  Prescription drug management.         Final diagnoses:   Cyst of right ovary          Evelyn Rajput, APRN  04/04/24 0950

## 2024-04-03 NOTE — DISCHARGE INSTRUCTIONS
Return to ER if symptoms worsen   Follow up with GYN  Return to the er if increased pain, vomiting, fever

## 2024-04-08 ENCOUNTER — OFFICE VISIT (OUTPATIENT)
Dept: FAMILY MEDICINE CLINIC | Facility: CLINIC | Age: 29
End: 2024-04-08
Payer: COMMERCIAL

## 2024-04-08 VITALS
OXYGEN SATURATION: 98 % | BODY MASS INDEX: 27.93 KG/M2 | WEIGHT: 173.8 LBS | HEART RATE: 70 BPM | DIASTOLIC BLOOD PRESSURE: 90 MMHG | TEMPERATURE: 97.1 F | HEIGHT: 66 IN | SYSTOLIC BLOOD PRESSURE: 130 MMHG | RESPIRATION RATE: 18 BRPM

## 2024-04-08 DIAGNOSIS — F41.9 ANXIETY: ICD-10-CM

## 2024-04-08 DIAGNOSIS — F90.9 ATTENTION DEFICIT HYPERACTIVITY DISORDER (ADHD), UNSPECIFIED ADHD TYPE: ICD-10-CM

## 2024-04-08 DIAGNOSIS — Z00.00 WELLNESS EXAMINATION: Primary | ICD-10-CM

## 2024-04-08 PROCEDURE — 99204 OFFICE O/P NEW MOD 45 MIN: CPT | Performed by: NURSE PRACTITIONER

## 2024-04-08 RX ORDER — ALPRAZOLAM 0.5 MG/1
0.5 TABLET ORAL 2 TIMES DAILY PRN
Qty: 60 TABLET | Refills: 0 | Status: SHIPPED | OUTPATIENT
Start: 2024-04-08

## 2024-04-08 RX ORDER — FLUOXETINE HYDROCHLORIDE 40 MG/1
80 CAPSULE ORAL DAILY
COMMUNITY

## 2024-04-08 RX ORDER — BUSPIRONE HYDROCHLORIDE 15 MG/1
15 TABLET ORAL EVERY 8 HOURS SCHEDULED
Qty: 90 TABLET | Refills: 5 | Status: SHIPPED | OUTPATIENT
Start: 2024-04-08

## 2024-04-08 RX ORDER — BUSPIRONE HYDROCHLORIDE 15 MG/1
TABLET ORAL EVERY 8 HOURS SCHEDULED
COMMUNITY
End: 2024-04-08 | Stop reason: SDUPTHER

## 2024-04-08 RX ORDER — TRAZODONE HYDROCHLORIDE 50 MG/1
TABLET ORAL EVERY 24 HOURS
COMMUNITY

## 2024-04-09 DIAGNOSIS — F41.9 ANXIETY: Primary | ICD-10-CM

## 2024-04-09 LAB
AMPHETAMINES UR QL SCN: NEGATIVE NG/ML
BARBITURATES UR QL SCN: NEGATIVE NG/ML
BENZODIAZ UR QL SCN: POSITIVE NG/ML
BZE UR QL SCN: NEGATIVE NG/ML
CANNABINOIDS UR QL SCN: POSITIVE NG/ML
CHOLEST SERPL-MCNC: 223 MG/DL (ref 100–199)
CREAT UR-MCNC: 181.3 MG/DL (ref 20–300)
HDLC SERPL-MCNC: 48 MG/DL
LABORATORY COMMENT REPORT: ABNORMAL
LDLC SERPL CALC-MCNC: 150 MG/DL (ref 0–99)
METHADONE UR QL SCN: NEGATIVE NG/ML
OPIATES UR QL SCN: NEGATIVE NG/ML
OXYCODONE+OXYMORPHONE UR QL SCN: NEGATIVE NG/ML
PCP UR QL: NEGATIVE NG/ML
PH UR: 5.8 [PH] (ref 4.5–8.9)
PROPOXYPH UR QL SCN: NEGATIVE NG/ML
T4 FREE SERPL-MCNC: 1.19 NG/DL (ref 0.82–1.77)
TRIGL SERPL-MCNC: 140 MG/DL (ref 0–149)
TSH SERPL DL<=0.005 MIU/L-ACNC: 0.41 UIU/ML (ref 0.45–4.5)
VLDLC SERPL CALC-MCNC: 25 MG/DL (ref 5–40)

## 2024-04-09 NOTE — PROGRESS NOTES
Yes, patient did drug screen, I checked with Jaclyn.   Attempted to reach patient regarding results, no answer. LVM for patient to return call.

## 2024-04-09 NOTE — PROGRESS NOTES
Please call result - ? Did she get drug screen done?  Won't be able to prescribe any refills, needs very soon.  Needs a thyroid recheck in 1 week d/t abnormal TSH.  Cholesterol is high.-Suggest low-fat/cholesterol diet, 30-minute brisk walk daily.    Electronically signed by JOANNE Vasquez, 04/09/24, 8:19 AM CDT.

## 2024-04-11 ENCOUNTER — PATIENT ROUNDING (BHMG ONLY) (OUTPATIENT)
Dept: FAMILY MEDICINE CLINIC | Facility: CLINIC | Age: 29
End: 2024-04-11
Payer: COMMERCIAL

## 2024-04-11 NOTE — PROGRESS NOTES
April 11, 2024    Hello, may I speak with Suzi Capone?    My name is June,      I am  with [facility]  [address] [facility] [address] [phone].    Before we get started may I verify your date of birth? 1995    I am calling to officially welcome you to our practice and ask about your recent visit. Is this a good time to talk? yes    Tell me about your visit with us. What things went well?  Fabiano was very professional and listened to my concern.       We're always looking for ways to make our patients' experiences even better. Do you have recommendations on ways we may improve?  no    Overall were you satisfied with your first visit to our practice? yes       I appreciate you taking the time to speak with me today. Is there anything else I can do for you? no      Thank you, and have a great day.

## 2024-04-15 NOTE — PROGRESS NOTES
Attempted to reach patient regarding lab results, no answer. LVM for patient to return call to office.

## 2024-04-29 ENCOUNTER — TELEPHONE (OUTPATIENT)
Dept: FAMILY MEDICINE CLINIC | Facility: CLINIC | Age: 29
End: 2024-04-29
Payer: COMMERCIAL

## 2024-04-29 NOTE — TELEPHONE ENCOUNTER
Caller: Suzi Capone    Relationship: Self    Best call back number:     [phone], REQUESTING A CALLBACK       Who are you requesting to speak with (clinical staff, provider,  specific staff member): CLINICAL    What was the call regarding: THE PATIENT STATES THAT SHE SPOKE TO  HER PREVIOUS  DOCTOR IN [place], DR. RAMOS BARROSO, THE PATIENT STATES THE ALPRAZOLAM THAT SHE TOOK IS 1 MG. THE PATIENT STATES THAT SHE IS CURRENTLY OUT OF THE MEDICATION AND FEELING ANXIOUS.   PLEASE ADVISE.

## 2024-04-29 NOTE — TELEPHONE ENCOUNTER
Patient will need to follow-up to discuss.  Please advise that the Xanax is ordered 0.5 mg twice a day as needed and should be taken that way until we discuss.  We want patient to feel better but we have to do it safe and she can't take 3 at a time.    Electronically signed by JOANNE Vasquez, 04/29/24, 2:48 PM CDT.

## 2024-04-29 NOTE — TELEPHONE ENCOUNTER
Called patient she states the color is different, peach not blue. Take 3 to feel normal and compose herself, patient took 6 before the medication started working. She would like to know what else she can do or if something else can be called in.

## 2024-04-29 NOTE — TELEPHONE ENCOUNTER
Caller: Suzi Capone    Relationship: Self    Best call back number:   [phone]    What is the best time to reach you: SOON PLEASE     Who are you requesting to speak with (clinical staff, provider,  specific staff member): PROVIDER OR CLINICAL STAFF       What was the call regarding: PATIENT REQUESTING A CALL BACK TO DISCUSS THE ALPRAZolam (XANAX) 0.5 MG tablet   NOT WORKING LIKE IT USUALLY DOES   PATIENT STATES SHE GOT THE PEACH ONES INSTEAD OF THE BLUE ONES LAST TIME AND DOESN'T KNOW IF THAT WOULD HAVE ANYTHING TO DO WITH OR NOT BUT MEDICATION IS NOT WORKING LIKE IT NORMALLY DOES    Is it okay if the provider responds through MyChart: PREFERS A CALL BACK

## 2024-04-30 ENCOUNTER — TELEPHONE (OUTPATIENT)
Dept: FAMILY MEDICINE CLINIC | Facility: CLINIC | Age: 29
End: 2024-04-30
Payer: COMMERCIAL

## 2024-04-30 ENCOUNTER — OFFICE VISIT (OUTPATIENT)
Dept: FAMILY MEDICINE CLINIC | Facility: CLINIC | Age: 29
End: 2024-04-30
Payer: COMMERCIAL

## 2024-04-30 VITALS
HEART RATE: 105 BPM | OXYGEN SATURATION: 99 % | SYSTOLIC BLOOD PRESSURE: 134 MMHG | DIASTOLIC BLOOD PRESSURE: 94 MMHG | HEIGHT: 66 IN | RESPIRATION RATE: 18 BRPM | WEIGHT: 172 LBS | TEMPERATURE: 97.1 F | BODY MASS INDEX: 27.64 KG/M2

## 2024-04-30 DIAGNOSIS — F41.9 ANXIETY: Primary | ICD-10-CM

## 2024-04-30 PROCEDURE — 99213 OFFICE O/P EST LOW 20 MIN: CPT | Performed by: NURSE PRACTITIONER

## 2024-04-30 RX ORDER — ALPRAZOLAM 1 MG/1
1 TABLET ORAL 2 TIMES DAILY PRN
Qty: 60 TABLET | Refills: 0 | Status: SHIPPED | OUTPATIENT
Start: 2024-04-30

## 2024-04-30 NOTE — TELEPHONE ENCOUNTER
Pt called requesting a refill on her Xanax, but asked if the dose could be increased to what she was originally taking. She is coming into the office today for labs, per provider request.

## 2024-05-01 NOTE — PROGRESS NOTES
"Subjective   Chief Complaint:  Medication discussion    History of Present Illness  She presents today with increased anxiety-some chronic mixed with situational.  She is on Xanax 0.5 mg p.o. twice a day-reports does not last long enough.  She reports she is taken 1 mg twice a day through her psychiatrist where she is from.    Past Medical, Surgical, Social, and Family History:  No Known Allergies   Current Outpatient Medications on File Prior to Visit   Medication Sig    amphetamine-dextroamphetamine XR (ADDERALL XR) 10 MG 24 hr capsule Take 1 capsule by mouth Every Morning    busPIRone (BUSPAR) 15 MG tablet Take 1 tablet by mouth Every 8 (Eight) Hours.    FLUoxetine (PROzac) 40 MG capsule Take 2 capsules by mouth Daily.     No current facility-administered medications on file prior to visit.      Past Medical History:   Diagnosis Date    Anxiety      Past Surgical History:   Procedure Laterality Date     SECTION      CYST REMOVAL      HYMENECTOMY      TONSILLECTOMY      WISDOM TOOTH EXTRACTION       Social History     Socioeconomic History    Marital status:      Spouse name: Nicholas    Number of children: 2    Years of education: 13   Tobacco Use    Smoking status: Former     Current packs/day: 0.00     Types: Cigarettes     Quit date: 2013     Years since quitting: 10.5     Passive exposure: Past    Smokeless tobacco: Never   Substance and Sexual Activity    Alcohol use: No    Drug use: Yes     Types: Marijuana    Sexual activity: Yes     Partners: Male     Birth control/protection: I.U.D.     Family History   Problem Relation Age of Onset    Diabetes Paternal Grandmother        Prior Visit Notes/Records, Lab, Imaging, and Diagnostic Results Reviewed:  A1C:No results for input(s): \"HGBA1C\" in the last 98187 hours.  GLUCOSE:  Lab Results - Last 18 Months   Lab Units 24  1645   GLUCOSE mg/dL 95     LIPID:  Lab Results - Last 18 Months   Lab Units 24  0938   CHOLESTEROL mg/dL 223* " "  LDL CHOL mg/dL 150*   HDL CHOL mg/dL 48   TRIGLYCERIDES mg/dL 140     PSA:No results for input(s): \"PSA\" in the last 45471 hours.  CBC:  Lab Results - Last 18 Months   Lab Units 04/03/24  1645   WBC 10*3/mm3 8.52   HEMOGLOBIN g/dL 13.5   HEMATOCRIT % 41.1   PLATELETS 10*3/mm3 378      BMP/CMP:  Lab Results - Last 18 Months   Lab Units 04/03/24  1645   SODIUM mmol/L 136   POTASSIUM mmol/L 3.7   CHLORIDE mmol/L 100   CO2 mmol/L 23.0   GLUCOSE mg/dL 95   BUN mg/dL 9   CREATININE mg/dL 0.64   CALCIUM mg/dL 10.1     HEPATIC:  Lab Results - Last 18 Months   Lab Units 04/03/24  1645   ALT (SGPT) U/L 38*   AST (SGOT) U/L 29   ALK PHOS U/L 73     Vit D:No results for input(s): \"DGML57IW\" in the last 78705 hours.  THYROID:  Lab Results - Last 18 Months   Lab Units 04/08/24  0938   TSH uIU/mL 0.405*   FREE T4 ng/dL 1.19     BMI Trend:  BMI Readings from Last 10 Encounters:   04/30/24 27.76 kg/m²   04/08/24 28.05 kg/m²   04/03/24 27.44 kg/m²   04/24/19 25.99 kg/m²   02/18/19 26.15 kg/m²   12/18/18 26.15 kg/m²   06/17/18 26.63 kg/m²   05/29/18 26.31 kg/m²   05/14/18 25.34 kg/m²   12/05/17 24.86 kg/m²     Objective   Vital Signs  /94 (BP Location: Left arm, Patient Position: Sitting, Cuff Size: Large Adult)   Pulse 105   Temp 97.1 °F (36.2 °C) (Infrared)   Resp 18   Ht 167.6 cm (66\")   Wt 78 kg (172 lb)   LMP 03/25/2024 (Within Weeks)   SpO2 99%   BMI 27.76 kg/m²   Physical Exam  Vitals reviewed.   Constitutional:       General: She is not in acute distress.     Appearance: Normal appearance.   Cardiovascular:      Rate and Rhythm: Normal rate and regular rhythm.   Pulmonary:      Effort: Pulmonary effort is normal.      Breath sounds: Normal breath sounds.   Psychiatric:      Comments: Calm and cooperative-openly voices feelings and needs.       Assessment & Plan   Diagnoses and all orders for this visit:    1. Anxiety (Primary)  -     ALPRAZolam (Xanax) 1 MG tablet; Take 1 tablet by mouth 2 (Two) Times a Day As " Needed for Anxiety.  Dispense: 60 tablet; Refill: 0    Discussions & Anticipatory Guidance:  Advised and educated plan of care.  We will titrate Xanax today.  Advised use.    The patient will Return in about 6 months (around 10/30/2024) for Follow-Up.      ISAIAS Co- used for dictation.    Electronically signed by JOANNE Vasquez, 05/01/24, 9:09 AM CDT.